# Patient Record
Sex: MALE | Race: WHITE | NOT HISPANIC OR LATINO | Employment: FULL TIME | URBAN - METROPOLITAN AREA
[De-identification: names, ages, dates, MRNs, and addresses within clinical notes are randomized per-mention and may not be internally consistent; named-entity substitution may affect disease eponyms.]

---

## 2018-05-23 LAB
ALBUMIN SERPL-MCNC: 4.6 G/DL
ALP SERPL-CCNC: 84 U/L
ALT SERPL-CCNC: 20 U/L
ANION GAP SERPL CALCULATED.3IONS-SCNC: 10 MMOL/L
AST SERPL-CCNC: 18 U/L
BILIRUB SERPL-MCNC: 2.2 MG/DL
BUN SERPL-MCNC: 13 MG/DL
CALCIUM SERPL-MCNC: 13 MG/DL
CHLORIDE SERPLBLD-SCNC: 107 MMOL/L
CHOLEST SERPL-MCNC: 240 MG/DL
CO2 SERPL-SCNC: 24 MMOL/L
CREAT SERPL-MCNC: 1.22 MG/DL
DIFFERENTIAL: NORMAL
ERYTHROCYTE [DISTWIDTH] IN BLOOD BY AUTOMATED COUNT: 13.1 %
GFR SERPL CREATININE-BSD FRML MDRD: >60 ML/MIN/1.73M2
GLUCOSE SERPL-MCNC: 102 MG/DL (ref 70–99)
HCT VFR BLD AUTO: 46 %
HDLC SERPL-MCNC: 47 MG/DL
HEMOGLOBIN: 15.5 G/DL (ref 13.3–17.7)
LDLC SERPL CALC-MCNC: 170 MG/DL
MCH RBC QN AUTO: 34 PG
MCHC RBC AUTO-ENTMCNC: 34 G/DL
MCV RBC AUTO: 99 FL
NONHDLC SERPL-MCNC: NORMAL MG/DL
PLATELET # BLD AUTO: 241 10^9/L
POTASSIUM SERPL-SCNC: 4.8 MMOL/L
PROT SERPL-MCNC: 6.8 G/DL
PSA SERPL-MCNC: 0.79 NG/ML
RBC # BLD AUTO: 4.62 10^12/L
SODIUM SERPL-SCNC: 141 MMOL/L
TRIGL SERPL-MCNC: 191 MG/DL
TSH SERPL-ACNC: 0.83 MCU/ML
WBC # BLD AUTO: 4.7 10^9/L

## 2020-11-03 NOTE — PROGRESS NOTES
3  SUBJECTIVE:   CC: Matt Haas is an 50 year old male who presents for preventive health visit.     Patient has been advised of split billing requirements and indicates understanding: Yes     Healthy Habits:    Do you get at least three servings of calcium containing foods daily (dairy, green leafy vegetables, etc.)? yes    Amount of exercise or daily activities, outside of work: Not consistently.     Problems taking medications regularly not applicable    Medication side effects: No    Have you had an eye exam in the past two years? yes    Do you see a dentist twice per year? yes    Do you have sleep apnea, excessive snoring or daytime drowsiness?no      PROBLEMS TO ADD ON...  Hypercholesterolemia  - per patient, he was previously taking a low dose of lipitor and he is interested in continuing this medication  - no previous medical records available at this point  - patient is agreeable to labs today to assess for efficacy and necessity of medication    Suspected hematospermia  - patient concerned he may have seen blood in his ejaculate once, back in September  - denies any re-occurrence  - no associated pain or fever    Today's PHQ-2 Score:   PHQ-2 ( 1999 Pf[employer]) 11/4/2020   Q1: Little interest or pleasure in doing things 0   Q2: Feeling down, depressed or hopeless 0   PHQ-2 Score 0       Abuse: Current or Past(Physical, Sexual or Emotional)- No  Do you feel safe in your environment? Yes        Social History     Tobacco Use     Smoking status: Never Smoker     Smokeless tobacco: Never Used   Substance Use Topics     Alcohol use: Yes     Frequency: 2-3 times a week     Drinks per session: 1 or 2     If you drink alcohol do you typically have >3 drinks per day or >7 drinks per week? No                      Last PSA: No results found for: PSA    Reviewed orders with patient. Reviewed health maintenance and updated orders accordingly - Yes    Patient is due for a colonoscopy, referred today    Family history  "of prostate cancer, decreased urination and question of hematospermia, patient agrees to PSA check today.     Reviewed and updated as needed this visit by clinical staff  Tobacco  Allergies  Meds  Problems  Med Hx  Surg Hx  Fam Hx          Reviewed and updated as needed this visit by Provider   Allergies  Meds  Problems  Med Hx  Surg Hx  Fam Hx         History reviewed. No pertinent past medical history.   History reviewed. No pertinent surgical history.    ROS:  CONSTITUTIONAL: NEGATIVE for fever, chills, change in weight  INTEGUMENTARY/SKIN: NEGATIVE for worrisome rashes, moles or lesions  EYES: NEGATIVE for vision changes or irritation  ENT: NEGATIVE for ear, mouth and throat problems  RESP: NEGATIVE for significant cough or SOB  CV: NEGATIVE for chest pain, palpitations or peripheral edema  GI: NEGATIVE for nausea, abdominal pain, heartburn, or change in bowel habits   male: negative for dysuria, hematuria, decreased urinary stream, erectile dysfunction, urethral discharge  MUSCULOSKELETAL: Mild intermittent RIGHT hip pain.  Otherwise, NEGATIVE for significant arthralgias or myalgia  NEURO: NEGATIVE for weakness, dizziness or paresthesias  PSYCHIATRIC: NEGATIVE for changes in mood or affect    OBJECTIVE:   /79 (BP Location: Right arm, Patient Position: Sitting, Cuff Size: Adult Large)   Pulse 68   Temp 97  F (36.1  C) (Skin)   Resp 16   Ht 1.816 m (5' 11.5\")   Wt 95.1 kg (209 lb 12 oz)   SpO2 96%   BMI 28.85 kg/m    EXAM:  GENERAL: healthy, alert and no distress  EYES: Eyes grossly normal to inspection, PERRL and conjunctivae and sclerae normal  HENT: ear canals and TM's normal, nose and mouth without ulcers or lesions  NECK: no adenopathy, no asymmetry, masses, or scars and thyroid normal to palpation  RESP: lungs clear to auscultation - no rales, rhonchi or wheezes  CV: regular rate and rhythm, normal S1 S2, no S3 or S4, no murmur, click or rub, no peripheral edema and peripheral " "pulses strong  ABDOMEN: soft, nontender, no hepatosplenomegaly, no masses and bowel sounds normal  MS: no gross musculoskeletal defects noted, no edema  SKIN: no suspicious lesions or rashes  NEURO: Normal strength and tone, mentation intact and speech normal  PSYCH: mentation appears normal, affect normal/bright    ASSESSMENT/PLAN:   Matt was seen today for physical.    Diagnoses and all orders for this visit:    Routine general medical examination at a health care facility    Need for vaccination  -     SD RIV4 (FLUBLOK) VACCINE RECOMBINANT DNA PRSRV ANTIBIO FREE, IM  -     SD ZOSTER VACCINE RECOMBINANT ADJUVANTED IM NJX    Screening for lipid disorders  -     Lipid Panel ([facility])  -     Basic Metabolic Panel ([facility])    Screening for prostate cancer  -     Prostate spec antigen screen    Screening for colon cancer  -     GASTROENTEROLOGY ADULT REF PROCEDURE ONLY; Future    I will look at patient's PSA today and I encouraged him to follow up with me if he notes recurrent hematospermia as I would strongly consider referral to urology.     I will follow up lipid panel and discuss continuing with statin once results are available.     Patient has been advised of split billing requirements and indicates understanding: Yes  COUNSELING:  Reviewed preventive health counseling, as reflected in patient instructions    Estimated body mass index is 28.85 kg/m  as calculated from the following:    Height as of this encounter: 1.816 m (5' 11.5\").    Weight as of this encounter: 95.1 kg (209 lb 12 oz).    Weight management plan: Discussed healthy diet and exercise guidelines    He reports that he has never smoked. He has never used smokeless tobacco.      Counseling Resources:  ATP IV Guidelines  Pooled Cohorts Equation Calculator  FRAX Risk Assessment  ICSI Preventive Guidelines  Dietary Guidelines for Americans, 2010  USDA's MyPlate  ASA Prophylaxis  Lung CA Screening    Wali Newberry MD  [facility]  "

## 2020-11-04 ENCOUNTER — OFFICE VISIT (OUTPATIENT)
Dept: FAMILY MEDICINE | Facility: CLINIC | Age: 50
End: 2020-11-04
Payer: COMMERCIAL

## 2020-11-04 VITALS
WEIGHT: 209.75 LBS | DIASTOLIC BLOOD PRESSURE: 79 MMHG | HEIGHT: 72 IN | OXYGEN SATURATION: 96 % | RESPIRATION RATE: 16 BRPM | SYSTOLIC BLOOD PRESSURE: 129 MMHG | HEART RATE: 68 BPM | BODY MASS INDEX: 28.41 KG/M2 | TEMPERATURE: 97 F

## 2020-11-04 DIAGNOSIS — Z12.11 SCREENING FOR COLON CANCER: ICD-10-CM

## 2020-11-04 DIAGNOSIS — Z12.5 SCREENING FOR PROSTATE CANCER: ICD-10-CM

## 2020-11-04 DIAGNOSIS — Z23 NEED FOR VACCINATION: ICD-10-CM

## 2020-11-04 DIAGNOSIS — Z13.220 SCREENING FOR LIPID DISORDERS: ICD-10-CM

## 2020-11-04 DIAGNOSIS — Z00.00 ROUTINE GENERAL MEDICAL EXAMINATION AT A HEALTH CARE FACILITY: Primary | ICD-10-CM

## 2020-11-04 LAB
BUN SERPL-MCNC: 14 MG/DL (ref 7–30)
CALCIUM SERPL-MCNC: 9 MG/DL (ref 8.5–10.4)
CHLORIDE SERPLBLD-SCNC: 104 MMOL/L (ref 94–109)
CHOLEST SERPL-MCNC: 253 MG/DL (ref 0–200)
CHOLEST/HDLC SERPL: 5.7 {RATIO} (ref 0–5)
CO2 SERPL-SCNC: 28 MMOL/L (ref 20–32)
CREAT SERPL-MCNC: 1.1 MG/DL (ref 0.8–1.5)
EGFR CALCULATED (BLACK REFERENCE): 90.4
EGFR CALCULATED (NON BLACK REFERENCE): 74.7
FASTING SPECIMEN: YES
GLUCOSE SERPL-MCNC: 107 MG/DL (ref 60–99)
HDLC SERPL-MCNC: 44 MG/DL
LDLC SERPL CALC-MCNC: 132 MG/DL (ref 0–129)
POTASSIUM SERPL-SCNC: 4.5 MMOL/L (ref 3.4–5.3)
PSA SERPL-ACNC: 0.97 UG/L (ref 0–4)
SODIUM SERPL-SCNC: 140 MMOL/L (ref 137.3–146.3)
TRIGL SERPL-MCNC: 381 MG/DL (ref 0–150)
VLDL-CHOLESTEROL: 76 (ref 7–32)

## 2020-11-04 SDOH — HEALTH STABILITY: MENTAL HEALTH: HOW MANY STANDARD DRINKS CONTAINING ALCOHOL DO YOU HAVE ON A TYPICAL DAY?: 1 OR 2

## 2020-11-04 SDOH — HEALTH STABILITY: MENTAL HEALTH: HOW OFTEN DO YOU HAVE A DRINK CONTAINING ALCOHOL?: 2-3 TIMES A WEEK

## 2020-11-04 SDOH — HEALTH STABILITY: MENTAL HEALTH: HOW OFTEN DO YOU HAVE 6 OR MORE DRINKS ON ONE OCCASION?: NOT ASKED

## 2020-11-04 ASSESSMENT — MIFFLIN-ST. JEOR: SCORE: 1831.48

## 2020-11-04 NOTE — LETTER
November 6, 2020      Matt Haas  [address]  [facility] [address]        Boone Gutierrez,     Here are the results from your recent labs. For the most part things look pretty good. Certainly we don't need to be talking about starting you on any medications at this point. Your cholesterol numbers are a little higher than I would like. At this point, my advice is mostly common sense stuff: try to stay active, eat mostly fruits and green leafy vegetables, with fish, lean meats or plant based proteins. Feel free to contact me if you have any questions about any of this.     Take care,     Wali Newberry MD    Resulted Orders   Lipid Panel ([facility])   Result Value Ref Range    FASTING SPECIMEN YES     Cholesterol 253.0 (H) 0.0 - 200.0    HDL Cholesterol 44.0 >40.0    Triglycerides 381.0 (H) 0.0 - 150.0    Cholesterol/HDL Ratio 5.7 (H) 0.0 - 5.0    LDL Cholesterol Direct 132.0 (H) 0.0 - 129.0    VLDL-Cholesterol 76.0 (H) 7.0 - 32.0   Basic Metabolic Panel ([facility])   Result Value Ref Range    Glucose 107.0 (H) 60.0 - 99.0 mg/dL    Urea Nitrogen 14.0 7.0 - 30.0 mg/dL    Calcium 9.0 8.5 - 10.4 mg/dL    Creatinine 1.1 0.8 - 1.5 mg/dL    eGFR Calculated (Non Black Reference) 74.7 >60.0    eGFR Calculated (Black Reference) 90.4 >60.0    Sodium 140.0 137.3 - 146.3 mmol/L    Potassium 4.5 3.4 - 5.3 mmol/L    Chloride 104.0 94.0 - 109.0 mmol/L    Carbon Dioxide 28.0 20.0 - 32.0 mmol/L   Prostate spec antigen screen   Result Value Ref Range    PSA 0.97 0 - 4 ug/L      Comment:      Assay Method:  Chemiluminescence using Siemens Vista analyzer

## 2020-11-04 NOTE — NURSING NOTE
"50 year old  Chief Complaint   Patient presents with     Physical     with shingles, flu, and labs       Blood pressure 129/79, pulse 68, temperature 97  F (36.1  C), temperature source Skin, resp. rate 16, height 1.816 m (5' 11.5\"), weight 95.1 kg (209 lb 12 oz), SpO2 96 %. Body mass index is 28.85 kg/m .  There is no problem list on file for this patient.      Wt Readings from Last 2 Encounters:   11/04/20 95.1 kg (209 lb 12 oz)     BP Readings from Last 3 Encounters:   11/04/20 129/79         No current outpatient medications on file.     No current facility-administered medications for this visit.        Social History     Tobacco Use     Smoking status: Never Smoker     Smokeless tobacco: Never Used   Substance Use Topics     Alcohol use: Yes     Frequency: 2-3 times a week     Drinks per session: 1 or 2     Drug use: Never       Health Maintenance Due   Topic Date Due     PREVENTIVE CARE VISIT  1968     COLORECTAL CANCER SCREENING  10/11/1978     HIV SCREENING  10/11/1983     HEPATITIS C SCREENING  10/11/1986     DTAP/TDAP/TD IMMUNIZATION (1 - Tdap) 10/11/1993     LIPID  10/11/2003     ZOSTER IMMUNIZATION (1 of 2) 10/11/2018     INFLUENZA VACCINE (1) 09/01/2020       No results found for: PAP      November 4, 2020 8:01 AM    "

## 2020-11-04 NOTE — NURSING NOTE
"Injectable Influenza Immunization Documentation    1.  Has the patient received the information for the injectable influenza vaccine? YES     2. Is the patient 6 months of age or older? YES     3. Does the patient have any of the following contraindications?         Severe allergy to eggs? No     Severe allergic reaction to previous influenza vaccines? No   Severe allergy to latex? No       History of Guillain-Frederic syndrome? No     Currently have a temperature greater than 100.4F? No        4.  Severely egg allergic patients should have flu vaccine eligibility assessed by an MD, RN, or pharmacist, and those who received flu vaccine should be observed for 15 min by an MD, RN, Pharmacist, Medical Technician, or member of clinic staff.\": YES    5. Latex-allergic patients should be given latex-free influenza vaccine No. Please reference the Vaccine latex table to determine if your clinic s product is latex-containing.       Vaccination given by Rocio Marcus CMA,[facility]  November 4, 2020 9:50 AM        Prior to immunization administration, verified patients identity using patient s name and date of birth. Please see Immunization Activity for additional information.     Screening Questionnaire for Adult Immunization    Are you sick today?   No   Do you have allergies to medications, food, a vaccine component or latex?   No   Have you ever had a serious reaction after receiving a vaccination?   No   Do you have a long-term health problem with heart, lung, kidney, or metabolic disease (e.g., diabetes), asthma, a blood disorder, no spleen, complement component deficiency, a cochlear implant, or a spinal fluid leak?  Are you on long-term aspirin therapy?   No   Do you have cancer, leukemia, HIV/AIDS, or any other immune system problem?   No   Do you have a parent, brother, or sister with an immune system problem?   No   In the past 3 months, have you taken medications that affect  your immune system, such as prednisone, other " steroids, or anticancer drugs; drugs for the treatment of rheumatoid arthritis, Crohn s disease, or psoriasis; or have you had radiation treatments?   No   Have you had a seizure, or a brain or other nervous system problem?   No   During the past year, have you received a transfusion of blood or blood    products, or been given immune (gamma) globulin or antiviral drug?   No   For women: Are you pregnant or is there a chance you could become       pregnant during the next month?   No   Have you received any vaccinations in the past 4 weeks?   No     Immunization questionnaire answers were all negative.        Per orders of Dr. Newberry, injection of Shigrix given by Rocio Marcus CMA. Patient instructed to remain in clinic for 15 minutes afterwards, and to report any adverse reaction to me immediately.       Screening performed by Rocio Marcus CMA on 11/4/2020 at 9:50 AM.

## 2020-11-05 ENCOUNTER — TELEPHONE (OUTPATIENT)
Dept: GASTROENTEROLOGY | Facility: CLINIC | Age: 50
End: 2020-11-05

## 2020-11-05 NOTE — TELEPHONE ENCOUNTER
Attempted to contact patient to schedule colonoscopy. Unable to LM as VM has not been set up yet.     Procedure: Lower Endoscopy    Lower Endoscopy Type: Colonoscopy    Purpose of Colonoscopy Procedure: Screening    Colonoscopy Sedation: Conscious/Moderate    Preferred Location: [facility]    Scheduling Instructions: If you have not heard from the scheduling office within 2 business days, please call [phone].      Dx: Screening for colon cancer [Z12.11]

## 2020-11-18 DIAGNOSIS — Z11.59 ENCOUNTER FOR SCREENING FOR OTHER VIRAL DISEASES: Primary | ICD-10-CM

## 2020-12-29 ENCOUNTER — TELEPHONE (OUTPATIENT)
Dept: GASTROENTEROLOGY | Facility: CLINIC | Age: 50
End: 2020-12-29

## 2021-01-04 ENCOUNTER — HOSPITAL ENCOUNTER (OUTPATIENT)
Facility: CLINIC | Age: 51
Setting detail: SPECIMEN
End: 2021-01-04
Payer: COMMERCIAL

## 2021-01-04 ENCOUNTER — ALLIED HEALTH/NURSE VISIT (OUTPATIENT)
Dept: FAMILY MEDICINE | Facility: CLINIC | Age: 51
End: 2021-01-04
Payer: COMMERCIAL

## 2021-01-04 DIAGNOSIS — Z11.59 ENCOUNTER FOR SCREENING FOR OTHER VIRAL DISEASES: ICD-10-CM

## 2021-01-04 DIAGNOSIS — Z23 NEED FOR VACCINATION: Primary | ICD-10-CM

## 2021-01-04 LAB
SARS-COV-2 RNA SPEC QL NAA+PROBE: NORMAL
SPECIMEN SOURCE: NORMAL

## 2021-01-04 PROCEDURE — 87635 SARS-COV-2 COVID-19 AMP PRB: CPT | Performed by: PATHOLOGY

## 2021-01-04 NOTE — PROGRESS NOTES
Prior to immunization administration, verified patients identity using patient s name and date of birth. Please see Immunization Activity for additional information.     Screening Questionnaire for Adult Immunization    Are you sick today?   No   Do you have allergies to medications, food, a vaccine component or latex?   No   Have you ever had a serious reaction after receiving a vaccination?   No   Do you have a long-term health problem with heart, lung, kidney, or metabolic disease (e.g., diabetes), asthma, a blood disorder, no spleen, complement component deficiency, a cochlear implant, or a spinal fluid leak?  Are you on long-term aspirin therapy?   No   Do you have cancer, leukemia, HIV/AIDS, or any other immune system problem?   No   Do you have a parent, brother, or sister with an immune system problem?   No   In the past 3 months, have you taken medications that affect  your immune system, such as prednisone, other steroids, or anticancer drugs; drugs for the treatment of rheumatoid arthritis, Crohn s disease, or psoriasis; or have you had radiation treatments?   No   Have you had a seizure, or a brain or other nervous system problem?   No   During the past year, have you received a transfusion of blood or blood    products, or been given immune (gamma) globulin or antiviral drug?   No   For women: Are you pregnant or is there a chance you could become       pregnant during the next month?   No   Have you received any vaccinations in the past 4 weeks?   No     Immunization questionnaire answers were all negative.        Per orders of Dr. Newberry, injection of 2nd Shingrix given by Rocio Marcus CMA. Patient instructed to remain in clinic for 15 minutes afterwards, and to report any adverse reaction to me immediately.       Screening performed by Rocio Marcus CMA on 1/4/2021 at 8:41 AM.    Matt Haas comes into clinic today at the request of Dr. Newberry Ordering Provider for Shingrix.    This service provided  today was under the supervising provider of the day Dr. Estrada, who was available if needed.    Rocio Marcus, CMA

## 2021-01-05 LAB
LABORATORY COMMENT REPORT: NORMAL
SARS-COV-2 RNA SPEC QL NAA+PROBE: NEGATIVE
SPECIMEN SOURCE: NORMAL

## 2021-01-07 ENCOUNTER — HOSPITAL ENCOUNTER (OUTPATIENT)
Facility: AMBULATORY SURGERY CENTER | Age: 51
Discharge: HOME | End: 2021-01-07
Attending: STUDENT IN AN ORGANIZED HEALTH CARE EDUCATION/TRAINING PROGRAM | Admitting: STUDENT IN AN ORGANIZED HEALTH CARE EDUCATION/TRAINING PROGRAM
Payer: COMMERCIAL

## 2021-01-07 VITALS
HEIGHT: 71 IN | OXYGEN SATURATION: 98 % | DIASTOLIC BLOOD PRESSURE: 89 MMHG | SYSTOLIC BLOOD PRESSURE: 125 MMHG | WEIGHT: 199 LBS | TEMPERATURE: 96.2 F | RESPIRATION RATE: 16 BRPM | BODY MASS INDEX: 27.86 KG/M2 | HEART RATE: 72 BPM

## 2021-01-07 LAB — COLONOSCOPY: NORMAL

## 2021-01-07 PROCEDURE — 45385 COLONOSCOPY W/LESION REMOVAL: CPT | Mod: 33

## 2021-01-07 PROCEDURE — 88305 TISSUE EXAM BY PATHOLOGIST: CPT | Mod: GC | Performed by: PATHOLOGY

## 2021-01-07 RX ORDER — NALOXONE HYDROCHLORIDE 0.4 MG/ML
0.2 INJECTION, SOLUTION INTRAMUSCULAR; INTRAVENOUS; SUBCUTANEOUS
Status: CANCELLED | OUTPATIENT
Start: 2021-01-07 | End: 2021-01-08

## 2021-01-07 RX ORDER — NALOXONE HYDROCHLORIDE 0.4 MG/ML
0.4 INJECTION, SOLUTION INTRAMUSCULAR; INTRAVENOUS; SUBCUTANEOUS
Status: CANCELLED | OUTPATIENT
Start: 2021-01-07 | End: 2021-01-08

## 2021-01-07 RX ORDER — ONDANSETRON 4 MG/1
4 TABLET, ORALLY DISINTEGRATING ORAL EVERY 6 HOURS PRN
Status: CANCELLED | OUTPATIENT
Start: 2021-01-07

## 2021-01-07 RX ORDER — FENTANYL CITRATE 50 UG/ML
INJECTION, SOLUTION INTRAMUSCULAR; INTRAVENOUS PRN
Status: DISCONTINUED | OUTPATIENT
Start: 2021-01-07 | End: 2021-01-07 | Stop reason: HOSPADM

## 2021-01-07 RX ORDER — LIDOCAINE 40 MG/G
CREAM TOPICAL
Status: DISCONTINUED | OUTPATIENT
Start: 2021-01-07 | End: 2021-01-08 | Stop reason: HOSPADM

## 2021-01-07 RX ORDER — PROCHLORPERAZINE MALEATE 10 MG
10 TABLET ORAL EVERY 6 HOURS PRN
Status: CANCELLED | OUTPATIENT
Start: 2021-01-07

## 2021-01-07 RX ORDER — FLUMAZENIL 0.1 MG/ML
0.2 INJECTION, SOLUTION INTRAVENOUS
Status: CANCELLED | OUTPATIENT
Start: 2021-01-07 | End: 2021-01-07

## 2021-01-07 RX ORDER — ONDANSETRON 2 MG/ML
4 INJECTION INTRAMUSCULAR; INTRAVENOUS EVERY 6 HOURS PRN
Status: CANCELLED | OUTPATIENT
Start: 2021-01-07

## 2021-01-07 RX ORDER — ONDANSETRON 2 MG/ML
4 INJECTION INTRAMUSCULAR; INTRAVENOUS
Status: DISCONTINUED | OUTPATIENT
Start: 2021-01-07 | End: 2021-01-08 | Stop reason: HOSPADM

## 2021-01-07 ASSESSMENT — MIFFLIN-ST. JEOR: SCORE: 1774.79

## 2021-01-08 LAB — COPATH REPORT: NORMAL

## 2021-02-03 ENCOUNTER — OFFICE VISIT (OUTPATIENT)
Dept: FAMILY MEDICINE | Facility: CLINIC | Age: 51
End: 2021-02-03
Payer: COMMERCIAL

## 2021-02-03 ENCOUNTER — TELEPHONE (OUTPATIENT)
Dept: FAMILY MEDICINE | Facility: CLINIC | Age: 51
End: 2021-02-03

## 2021-02-03 VITALS
HEIGHT: 72 IN | WEIGHT: 210.5 LBS | OXYGEN SATURATION: 98 % | RESPIRATION RATE: 15 BRPM | DIASTOLIC BLOOD PRESSURE: 101 MMHG | BODY MASS INDEX: 28.51 KG/M2 | SYSTOLIC BLOOD PRESSURE: 150 MMHG | HEART RATE: 73 BPM | TEMPERATURE: 96.7 F

## 2021-02-03 DIAGNOSIS — R03.0 ELEVATED BLOOD PRESSURE READING WITHOUT DIAGNOSIS OF HYPERTENSION: ICD-10-CM

## 2021-02-03 DIAGNOSIS — I49.8 OTHER CARDIAC ARRHYTHMIA: Primary | ICD-10-CM

## 2021-02-03 DIAGNOSIS — R00.2 PALPITATIONS: ICD-10-CM

## 2021-02-03 LAB
ANION GAP SERPL CALCULATED.3IONS-SCNC: 3 MMOL/L (ref 3–14)
BASOPHILS # BLD AUTO: 0 10E9/L (ref 0–0.2)
BASOPHILS NFR BLD AUTO: 0.7 %
BUN SERPL-MCNC: 11 MG/DL (ref 7–30)
CALCIUM SERPL-MCNC: 9.7 MG/DL (ref 8.5–10.1)
CHLORIDE SERPL-SCNC: 104 MMOL/L (ref 94–109)
CO2 SERPL-SCNC: 29 MMOL/L (ref 20–32)
CREAT SERPL-MCNC: 1.06 MG/DL (ref 0.66–1.25)
DIFFERENTIAL METHOD BLD: NORMAL
EOSINOPHIL # BLD AUTO: 0.1 10E9/L (ref 0–0.7)
EOSINOPHIL NFR BLD AUTO: 1.8 %
ERYTHROCYTE [DISTWIDTH] IN BLOOD BY AUTOMATED COUNT: 12 % (ref 10–15)
GFR SERPL CREATININE-BSD FRML MDRD: 80 ML/MIN/{1.73_M2}
GLUCOSE SERPL-MCNC: 88 MG/DL (ref 70–99)
HCT VFR BLD AUTO: 46.3 % (ref 40–53)
HGB BLD-MCNC: 16 G/DL (ref 13.3–17.7)
IMM GRANULOCYTES # BLD: 0 10E9/L (ref 0–0.4)
IMM GRANULOCYTES NFR BLD: 0.3 %
LYMPHOCYTES # BLD AUTO: 1.4 10E9/L (ref 0.8–5.3)
LYMPHOCYTES NFR BLD AUTO: 22.5 %
MCH RBC QN AUTO: 32.5 PG (ref 26.5–33)
MCHC RBC AUTO-ENTMCNC: 34.6 G/DL (ref 31.5–36.5)
MCV RBC AUTO: 94 FL (ref 78–100)
MONOCYTES # BLD AUTO: 0.5 10E9/L (ref 0–1.3)
MONOCYTES NFR BLD AUTO: 8.8 %
NEUTROPHILS # BLD AUTO: 4 10E9/L (ref 1.6–8.3)
NEUTROPHILS NFR BLD AUTO: 65.9 %
NRBC # BLD AUTO: 0 10*3/UL
NRBC BLD AUTO-RTO: 0 /100
PLATELET # BLD AUTO: 208 10E9/L (ref 150–450)
PLATELET # BLD EST: NORMAL 10*3/UL
POTASSIUM SERPL-SCNC: 5.1 MMOL/L (ref 3.4–5.3)
RBC # BLD AUTO: 4.92 10E12/L (ref 4.4–5.9)
SODIUM SERPL-SCNC: 136 MMOL/L (ref 133–144)
TSH SERPL DL<=0.005 MIU/L-ACNC: 1.26 MU/L (ref 0.4–4)
WBC # BLD AUTO: 6.1 10E9/L (ref 4–11)

## 2021-02-03 ASSESSMENT — MIFFLIN-ST. JEOR: SCORE: 1834.88

## 2021-02-03 NOTE — PROGRESS NOTES
Assessment & Plan     Other cardiac arrhythmia  - Cardiology Adult Referral - [place]; Future    Palpitations  - EKG 12-lead complete w/read - Clinics  - EKG rhythm strip  - TSH with free T4 reflex  - Basic metabolic panel  - CBC with platelets differential  - Cardiology Adult Referral - [place]; Future    Elevated blood pressure without a diagnosis of HTN  Continue to monitor    To ED if racing heart, chest pain, shortness of breath or lightheadedness.  Avoid exercise pending cardiology eval.  No alcohol, caffeine or decongestants.      35 minutes spent on the date of the encounter doing chart review, history and exam, documentation and further activities as noted above      Shelby Shin PA-C  [facility]    Kofi Gutierrez is a 50 year old who presents to clinic today for the following health issues     HPI     CHIEF COMPLAINT:  Irregular heart beat.  First noted on Monday when while working out his heart rate went up to 200.  He rested and it returned to normal.  Next day he woke up and felt fine but noted palpitations later in the day after work.  He went to sleep and woke up feeling fine and then developed the palpitations again today at 10AM.  Today they have been present most of the day.  Heart rate has not been fast but definitely irregular    He denies lightheadedness, dizziness, chest pain, shortness of breath and diaphoresis. BP is elevated today without a history of HTN.    No history of thyroid issues, recent blood loss or history of electrolyte problems.  He has 2 cups of coffee during the day and atleast one diet Mountain Dew.      BP Readings from Last 6 Encounters:   02/03/21 (!) 150/101   01/07/21 125/89   11/04/20 129/79       Review of Systems   Constitutional, HEENT, cardiovascular, pulmonary, gi and gu systems are negative, except as otherwise noted.      Objective    BP (!) 150/101   Pulse 73   Temp 96.7  F (35.9  C) (Skin)    "Resp 15   Ht 1.816 m (5' 11.5\")   Wt 95.5 kg (210 lb 8 oz)   SpO2 98%   BMI 28.95 kg/m    Body mass index is 28.95 kg/m .  Physical Exam   GENERAL: healthy, alert and no distress  NECK: no adenopathy, no asymmetry, masses, or scars and thyroid normal to palpation  RESP: lungs clear to auscultation - no rales, rhonchi or wheezes  CV: irregularly irregular rhythm, normal S1 S2, no S3 or S4, no murmur, click or rub, peripheral pulses strong, no peripheral edema and no bruits heard   MS: no gross musculoskeletal defects noted, no clubbing, edema or cyanosis of extremities.  PSYCH: mentation appears normal, affect normal/bright and anxious    EKG - Reviewed and interpreted by me Sinus rythym, normal axis, , no acute ST/T wave changes,multiple ectopic ventricular beats possibly junctional, there are no prior tracings available  Atrialventricular owen reentrant             "

## 2021-02-03 NOTE — TELEPHONE ENCOUNTER
"Patient is scheduled for visit for \"palpitations\". Call placed to patient for more information on his symptoms. LVM - Please call back to speak to a nurse.     Vane Muhammad RN  02/03/21  1:23 PM    "

## 2021-02-03 NOTE — NURSING NOTE
"50 year old  Chief Complaint   Patient presents with     Palpitations     heart rate up to 200s when working out yesterday, has been feeling irregular heart beats, EKG on watch showed irregular rhythm/a-fib       Blood pressure (!) 160/98, pulse (!) 47, temperature 96.7  F (35.9  C), temperature source Skin, resp. rate 15, height 1.816 m (5' 11.5\"), weight 95.5 kg (210 lb 8 oz), SpO2 98 %. Body mass index is 28.95 kg/m .  There is no problem list on file for this patient.      Wt Readings from Last 2 Encounters:   02/03/21 95.5 kg (210 lb 8 oz)   01/07/21 90.3 kg (199 lb)     BP Readings from Last 3 Encounters:   02/03/21 (!) 160/98   01/07/21 125/89   11/04/20 129/79         No current outpatient medications on file.     No current facility-administered medications for this visit.        Social History     Tobacco Use     Smoking status: Never Smoker     Smokeless tobacco: Never Used   Substance Use Topics     Alcohol use: Yes     Frequency: 2-3 times a week     Drinks per session: 1 or 2     Drug use: Never       Health Maintenance Due   Topic Date Due     HIV SCREENING  10/11/1983     HEPATITIS C SCREENING  10/11/1986       No results found for: PAP      February 3, 2021 4:10 PM    "

## 2021-02-04 ENCOUNTER — TELEPHONE (OUTPATIENT)
Dept: CARDIOLOGY | Facility: CLINIC | Age: 51
End: 2021-02-04

## 2021-02-04 ENCOUNTER — MYC MEDICAL ADVICE (OUTPATIENT)
Dept: FAMILY MEDICINE | Facility: CLINIC | Age: 51
End: 2021-02-04

## 2021-02-04 NOTE — TELEPHONE ENCOUNTER
RECORDS RECEIVED FROM: Internal    DATE RECEIVED: 2.5.21    NOTES STATUS DETAILS   OFFICE NOTE from referring provider    Shelby Romero   OFFICE NOTE from other cardiologist    na    DISCHARGE SUMMARY from hospital    na    DISCHARGE REPORT from the ER   na    OPERATIVE REPORT    na    MEDICATION LIST   Internal     LABS     BMP   Internal  2.3.21   CBC   Internal  2.3.21   CMP   Internal  5.23.18   Lipids   Internal  5.23.18   TSH   Internal  2.3.21   DIAGNOSTIC PROCEDURES     EKG   Internal  2.3.21   Monitor Reports   na    IMAGING (DISC & REPORT)      Echo   na    Stress Tests   na    Cath   na    MRI/MRA   na    CT/CTA   na

## 2021-02-04 NOTE — TELEPHONE ENCOUNTER
Hi  Please see Rina Shin's visit note form yesterday 2/3, and attached message - she would like pt seen within a week.  Please let us know what you come up with - thanks much -  Melanie Lam RN  [facility]

## 2021-02-04 NOTE — TELEPHONE ENCOUNTER
Called patient and left VM to call back if he can do an appointment today or tomorrow for a virtual appoint for palpitations

## 2021-02-04 NOTE — TELEPHONE ENCOUNTER
"Can we send a message to cardiology team to see if we can get this pateint in sooner than 3 weeks from now.  He has a significant arrhythmia and has had episodes of atrial fibrillation with a heart rate up to 200 per his \"watch\". No lightheadedness or dizziness. He would be willing to meet virtually.  EKG and rhythm strip are in Epic.   PLease let me know what you find out. I would consider an econsult but I really think this warrants a full visit and 3 weeks is too far out.  Thanks  Rina Shin PA-C     "

## 2021-02-04 NOTE — TELEPHONE ENCOUNTER
Pt called by cardiology for appt Friday 2/5 with Dr Lali Montana .  Melanie Lam RN  [facility]

## 2021-02-05 ENCOUNTER — VIRTUAL VISIT (OUTPATIENT)
Dept: CARDIOLOGY | Facility: CLINIC | Age: 51
End: 2021-02-05
Attending: PHYSICIAN ASSISTANT
Payer: COMMERCIAL

## 2021-02-05 ENCOUNTER — PRE VISIT (OUTPATIENT)
Dept: CARDIOLOGY | Facility: CLINIC | Age: 51
End: 2021-02-05

## 2021-02-05 DIAGNOSIS — R00.2 PALPITATIONS: ICD-10-CM

## 2021-02-05 DIAGNOSIS — E78.5 HYPERLIPIDEMIA LDL GOAL <100: ICD-10-CM

## 2021-02-05 DIAGNOSIS — I49.3 PVC'S (PREMATURE VENTRICULAR CONTRACTIONS): ICD-10-CM

## 2021-02-05 DIAGNOSIS — I49.1 PREMATURE ATRIAL BEAT: Primary | ICD-10-CM

## 2021-02-05 PROCEDURE — 99205 OFFICE O/P NEW HI 60 MIN: CPT | Mod: 95 | Performed by: INTERNAL MEDICINE

## 2021-02-05 RX ORDER — ROSUVASTATIN CALCIUM 10 MG/1
10 TABLET, COATED ORAL DAILY
Qty: 90 TABLET | Refills: 3 | Status: SHIPPED | OUTPATIENT
Start: 2021-02-05 | End: 2022-01-19

## 2021-02-05 RX ORDER — METOPROLOL SUCCINATE 25 MG/1
25 TABLET, EXTENDED RELEASE ORAL DAILY
Qty: 90 TABLET | Refills: 3 | Status: SHIPPED | OUTPATIENT
Start: 2021-02-05 | End: 2021-06-11

## 2021-02-05 NOTE — PROGRESS NOTES
"Pablo is a 50 year old who is being evaluated via a billable video visit.       How would you like to obtain your AVS? MyChart  If the video visit is dropped, the invitation should be resent by: Text to cell phone: [phone]  Will anyone else be joining your video visit? No       Video Start Time: 7:05 AM      Vitals - Patient Reported  Weight (Patient Reported): 92.8 kg (204 lb 9.6 oz)  Height (Patient Reported): 181.6 cm (5' 11.5\")  BMI (Based on Pt Reported Ht/Wt): 28.14  Pain Score: No Pain (0)(No SOB)        Video-Visit Details     Type of service:  Video Visit     Video End Time:7:53 AM    Originating Location (pt. Location): Home     Distant Location (provider location):  [facility] HEART [facility]      Platform used for Video Visit: [facility]    CARDIOLOGY CLINIC INITIAL CONSULTATION    HPI:  Matt Haas is a 50 year old male who receives primary care from Dr. Wali Newberry. He was referred to Cardiology clinic by Rina Shin for palpitations.    Pablo is a pleasant man with no history of atherosclerotic CVD. CVD risk factors include hyperlipidemia.  He is a former smoker and quit in 2001.  His main complaint today is about a 1 week history of very bothersome palpitations.  He has noticed irregular, erratic beats.  He occasionally feels lightheaded, fullness in his neck, chest discomfort.  During this timeframe he has also noticed that his blood pressure has gone up which is uncommon for him.  He is generally healthy and is not on any medications currently.  He denies PND, orthopnea, LE edema, dyspnea on exertion.    Pablo wears an apple watch which showed a heart rate up to 200 when he was working out earlier this week.  The apple watch also alerted him that he may be having atrial fibrillation.  He had 2 EKGs done that I can review which showed frequent PACs and PVCs.  His laboratory work-up has been unremarkable including a normal TSH, normal electrolytes, normal WBC and hemoglobin.  He denies any " infectious symptoms.  No symptoms of hypoglycemia.  No major change in diet or caffeine intake.  No stimulant drug or supplemental use.  Michael usually has 2 cups of coffee in the morning and a soda in the afternoon, but this has been constant.    We also talked about cardiovascular disease risk today.  Pablo is a young man with a pretty healthy lifestyle.  Despite that his LDL cholesterol is elevated and has been as high as 170.  High cholesterol is common on his mother side, but no one on that side has had any early or even later life heart disease.  Pablo spends about 30 minutes every day working out on the treadmill or Peloton.  He has been on atorvastatin and rosuvastatin in the past, but has recently stopped it after discussing with his primary care provider.  It sounds like he has had coronary artery calcium scans before which showed some calcifications, but I am unable to review this data.    The 10-year ASCVD risk score (Rufus SHARPE Jr., et al., 2013) is: 8.5%*    Values used to calculate the score:      Age: 50 years      Sex: Male      Is Non- : No      Diabetic: No      Tobacco smoker: No      Systolic Blood Pressure: 150 mmHg      Is BP treated: No      HDL Cholesterol: 44 mg/dL*      Total Cholesterol: 253 mg/dL*      * - Cholesterol units were assumed for this score calculation     ASSESSMENT AND PLAN  Matt Haas is a 50 year old male who presents for palpitations that seem to be related to frequent PACs and PVCs.  He does not have any symptoms of angina or heart failure.  His apple watch alerted him to atrial fibrillation, but is unclear if he actually had this rhythm.  He is understandably concerned about the abnormal rhythm and the symptoms that he is having.  We discussed the relatively benign nature of PACs and PVCs, although they can certainly be bothersome.  I would like him to wear a Zio patch to make sure that were not missing another type of arrhythmia, specifically  atrial fibrillation.  We will also get an echo to ensure there are no structural abnormalities.  I offered to start a low-dose metoprolol to help with symptoms.  I sent the prescription and Pablo will decide if he is interested in starting now or waiting until we have more data.  Increased stress can certainly be contributing to symptoms so we also discussed stress management techniques, like mindfulness.    In terms of CVD prevention, it sounds like Pablo does have evidence of early CAD based on a CAC scan.  He does have elevated cholesterol and there is likely genetic component to this given his healthy lifestyle.  Fortunately there has been no early CVD in his family.  I would still recommend starting at least a moderate dose of lipid-lowering therapy.  We discussed repeating a CAC scan, which he agrees with.  We will also start rosuvastatin 10 mg daily and repeat a lipid panel in 2 months.  He is scheduled to do the executive health program at [place] in the next couple months where he will receive more information and data regarding his cardiovascular health.    Recommendations:  -2-week Zio patch  -Echo  -I will write a prescription for metoprolol 25 mg daily.  If palpitations remain bothersome, Pablo can start this at any time.  -Start rosuvastatin 10 mg daily  -Repeat lipid panel in 2 months  -I will contact Pablo after the test results are back and determine a follow-up plan at that time    Thank you for the opportunity to participate in the care of Mr. Pablo Haas. Please feel free to contact me with any additional questions or concerns.    Ag Montana MD    Preventive Cardiology  Pager: [phone]    More than 50% of the face-to-face time was spent counseling the patient.  Total face-to-face time on video was 48 minutes; and an additional 15 minutes was spent reviewing the records for imaging, labs, pathology reports, and clinic notes from internal and external records and documenting  our visit.      PAST MEDICAL HISTORY:  Patient Active Problem List   Diagnosis     Palpitations     No past medical history on file.    CURRENT MEDICATIONS:  Current Outpatient Medications   Medication Sig Dispense Refill     metoprolol succinate ER (TOPROL-XL) 25 MG 24 hr tablet Take 1 tablet (25 mg) by mouth daily 90 tablet 3     rosuvastatin (CRESTOR) 10 MG tablet Take 1 tablet (10 mg) by mouth daily 90 tablet 3       PAST SURGICAL HISTORY:  Past Surgical History:   Procedure Laterality Date     COLONOSCOPY N/A 2021    Procedure: COLONOSCOPY, WITH POLYPECTOMY;  Surgeon: Elina Whyte MD;  Location: [facility] OR       ALLERGIES  Patient has no known allergies.    FAMILY HX:  Family History   Problem Relation Age of Onset     Prostate Cancer Father      Parkinsonism Father      Colon Cancer Maternal Grandfather      Myocardial Infarction Paternal Grandfather 70     Pacemaker Paternal Grandfather      Hyperlipidemia Mother      Hyperlipidemia Maternal Grandmother        SOCIAL HX:  Social History     Tobacco Use     Smoking status: Former Smoker     Years: 16.00     Quit date:      Years since quittin.1     Smokeless tobacco: Never Used   Substance Use Topics     Alcohol use: Yes     Frequency: 2-3 times a week     Drinks per session: 1 or 2     Drug use: Never        ROS:  Comprehensive ROS is negative except as noted in HPI.    VITAL SIGNS:  There were no vitals taken for this visit.  There is no height or weight on file to calculate BMI.  Wt Readings from Last 2 Encounters:   21 95.5 kg (210 lb 8 oz)   21 90.3 kg (199 lb)       PHYSICAL EXAM  Gen: pleasant man sitting comfortably in NAD  Head: nc/at  Eyes: EOMI  Resp: Breathing comfortably on room air  Skin: no rash on limited exam  Neuro: awake, alert, oriented, nml speech  Pysch: Normal affect    The rest of a comprehensive physical examination is deferred due to PHE (public health emergency) video visit restrictions.     LABS: personally  reviewed  CMP  Recent Labs   Lab Test 02/03/21  1636 11/04/20 0828 05/23/18    140.0 141   POTASSIUM 5.1 4.5 4.8   CHLORIDE 104 104.0 107   CO2 29 28.0 24   ANIONGAP 3  --  10   GLC 88 107.0* 102*   BUN 11 14.0 13   CR 1.06 1.1 1.22   GFRESTIMATED 80  --  >60   GFRESTBLACK >90  --  >60   DONNA 9.7 9.0 13   PROTTOTAL  --   --  6.8   ALBUMIN  --   --  4.6   BILITOTAL  --   --  2.2   ALKPHOS  --   --  84   AST  --   --  18   ALT  --   --  20     CBC  Recent Labs   Lab Test 02/03/21  1636 05/23/18   WBC 6.1 4.7   RBC 4.92 4.62   HGB 16.0 15.5   HCT 46.3 46   MCV 94 99   MCH 32.5 34   MCHC 34.6 34   RDW 12.0 13.1    241     INRNo lab results found.  Recent Labs   Lab Test 11/04/20 0828 05/23/18   CHOL 253.0* 240   HDL 44.0 47   .0* 170   TRIG 381.0* 191   CHOLHDLRATIO 5.7*  --      No lab results found.    Most recent EKG: reviewed and discussed with the patient  2/3/2021: Sinus rhythm with frequent PACs and PVCs

## 2021-02-05 NOTE — PROGRESS NOTES
"Pablo is a 50 year old who is being evaluated via a billable video visit.      How would you like to obtain your AVS? MyChart  If the video visit is dropped, the invitation should be resent by: Text to cell phone: [phone]  Will anyone else be joining your video visit? No  {If patient encounters technical issues they should call [phone] :[id number]}    Video Start Time: {video visit start/end time for provider to select:152948}     Vitals - Patient Reported  Weight (Patient Reported): 92.8 kg (204 lb 9.6 oz)  Height (Patient Reported): 181.6 cm (5' 11.5\")  BMI (Based on Pt Reported Ht/Wt): 28.14  Pain Score: No Pain (0)(No SOB)        Video-Visit Details    Type of service:  Video Visit    Video End Time:{video visit start/end time for provider to select:152948}    Originating Location (pt. Location): {video visit patient location:358059::\"Home\"}    Distant Location (provider location):  [facility]     Platform used for Video Visit: {Virtual Visit Platforms:249172::\"SI-BONE\"}  "

## 2021-02-05 NOTE — PATIENT INSTRUCTIONS
"You were seen today in the Cardiovascular Clinic at the [facility].     Cardiology Providers you saw during your visit: Dr. Ag Montana    Diagnosis:   Encounter Diagnoses   Name Primary?     Palpitations      Premature atrial beat Yes     Hyperlipidemia LDL goal <100           Orders:   Orders Placed This Encounter   Procedures     CT Coronary Calcium Scan     Leadless EKG Monitor 8 to 14 Days     Echocardiogram Complete       Recommendations:   1. Schedule a visit to the [facility] on [address] to get your heart monitor hooked up.  2. Schedule a heart ultrasound (echo) at [facility].  3. Schedule a coronary artery calcium scan whenever it is convenient.  4. Start rosuvastatin 10mg daily.  Repeat lipid panel in 2 months  5. If the palpitations become very bothersome, start metoprolol 25mg once a day.  6. If you are interested in more in depth evaluation for early signs of heart disease, we can refer you to the [place] [facility].   7. Follow up based on the test results.      Please feel free to call me with any questions or concerns.       Emily PARKER LPN     Questions: [phone]  First press #1 for the [employer] for \"Medical Questions\" to reach us Cardiology Nurses.   [phone]   First press #1 for the [employer] and then press #1     On Call Cardiologist for after hours or on weekends: [phone]   option #4 and ask to speak to the on-call Cardiologist.          If you need a medication refill please contact your pharmacy.  Please allow 3 business days for your refill to be completed.  --------------------------------------------------------------   "

## 2021-02-05 NOTE — LETTER
"2/5/2021      RE: Matt Haas  [address]  [place] [address]       Dear Colleague,    Thank you for the opportunity to participate in the care of your patient, Matt Haas, at the [facility] HEART [facility]. Please see a copy of my visit note below.    Pablo is a 50 year old who is being evaluated via a billable video visit.       How would you like to obtain your AVS? MyChart  If the video visit is dropped, the invitation should be resent by: Text to cell phone: [phone]  Will anyone else be joining your video visit? No       Video Start Time: 7:05 AM      Vitals - Patient Reported  Weight (Patient Reported): 92.8 kg (204 lb 9.6 oz)  Height (Patient Reported): 181.6 cm (5' 11.5\")  BMI (Based on Pt Reported Ht/Wt): 28.14  Pain Score: No Pain (0)(No SOB)        Video-Visit Details     Type of service:  Video Visit     Video End Time:7:53 AM    Originating Location (pt. Location): Home     Distant Location (provider location):  [facility] HE[facility]      Platform used for Video Visit: [facility]    CARDIOLOGY CLINIC INITIAL CONSULTATION    HPI:  Matt Haas is a 50 year old male who receives primary care from Dr. Wali Newberry. He was referred to Cardiology clinic by Rina Shin for palpitations.    Pablo is a pleasant man with no history of atherosclerotic CVD. CVD risk factors include hyperlipidemia.  He is a former smoker and quit in 2001.  His main complaint today is about a 1 week history of very bothersome palpitations.  He has noticed irregular, erratic beats.  He occasionally feels lightheaded, fullness in his neck, chest discomfort.  During this timeframe he has also noticed that his blood pressure has gone up which is uncommon for him.  He is generally healthy and is not on any medications currently.  He denies PND, orthopnea, LE edema, dyspnea on exertion.    Pablo wears an apple watch which showed a " heart rate up to 200 when he was working out earlier this week.  The apple watch also alerted him that he may be having atrial fibrillation.  He had 2 EKGs done that I can review which showed frequent PACs and PVCs.  His laboratory work-up has been unremarkable including a normal TSH, normal electrolytes, normal WBC and hemoglobin.  He denies any infectious symptoms.  No symptoms of hypoglycemia.  No major change in diet or caffeine intake.  No stimulant drug or supplemental use.  Michael usually has 2 cups of coffee in the morning and a soda in the afternoon, but this has been constant.    We also talked about cardiovascular disease risk today.  Pablo is a young man with a pretty healthy lifestyle.  Despite that his LDL cholesterol is elevated and has been as high as 170.  High cholesterol is common on his mother side, but no one on that side has had any early or even later life heart disease.  Pablo spends about 30 minutes every day working out on the treadmill or Peloton.  He has been on atorvastatin and rosuvastatin in the past, but has recently stopped it after discussing with his primary care provider.  It sounds like he has had coronary artery calcium scans before which showed some calcifications, but I am unable to review this data.    The 10-year ASCVD risk score (Rufus ANNEMARIE Jr., et al., 2013) is: 8.5%*    Values used to calculate the score:      Age: 50 years      Sex: Male      Is Non- : No      Diabetic: No      Tobacco smoker: No      Systolic Blood Pressure: 150 mmHg      Is BP treated: No      HDL Cholesterol: 44 mg/dL*      Total Cholesterol: 253 mg/dL*      * - Cholesterol units were assumed for this score calculation     ASSESSMENT AND PLAN  Matt Haas is a 50 year old male who presents for palpitations that seem to be related to frequent PACs and PVCs.  He does not have any symptoms of angina or heart failure.  His apple watch alerted him to atrial fibrillation, but is unclear  if he actually had this rhythm.  He is understandably concerned about the abnormal rhythm and the symptoms that he is having.  We discussed the relatively benign nature of PACs and PVCs, although they can certainly be bothersome.  I would like him to wear a Zio patch to make sure that were not missing another type of arrhythmia, specifically atrial fibrillation.  We will also get an echo to ensure there are no structural abnormalities.  I offered to start a low-dose metoprolol to help with symptoms.  I sent the prescription and Pablo will decide if he is interested in starting now or waiting until we have more data.  Increased stress can certainly be contributing to symptoms so we also discussed stress management techniques, like mindfulness.    In terms of CVD prevention, it sounds like Pablo does have evidence of early CAD based on a CAC scan.  He does have elevated cholesterol and there is likely genetic component to this given his healthy lifestyle.  Fortunately there has been no early CVD in his family.  I would still recommend starting at least a moderate dose of lipid-lowering therapy.  We discussed repeating a CAC scan, which he agrees with.  We will also start rosuvastatin 10 mg daily and repeat a lipid panel in 2 months.  He is scheduled to do the executive health program at [place] in the next couple months where he will receive more information and data regarding his cardiovascular health.    Recommendations:  -2-week Zio patch  -Echo  -I will write a prescription for metoprolol 25 mg daily.  If palpitations remain bothersome, Pablo can start this at any time.  -Start rosuvastatin 10 mg daily  -Repeat lipid panel in 2 months  -I will contact Pablo after the test results are back and determine a follow-up plan at that time    Thank you for the opportunity to participate in the care of Mr. Pablo Haas. Please feel free to contact me with any additional questions or concerns.    Ag Montana MD  Assistant  Professor  Preventive Cardiology  Pager: [phone]    More than 50% of the face-to-face time was spent counseling the patient.  Total face-to-face time on video was 48 minutes; and an additional 15 minutes was spent reviewing the records for imaging, labs, pathology reports, and clinic notes from internal and external records and documenting our visit.      PAST MEDICAL HISTORY:  Patient Active Problem List   Diagnosis     Palpitations     No past medical history on file.    CURRENT MEDICATIONS:  Current Outpatient Medications   Medication Sig Dispense Refill     metoprolol succinate ER (TOPROL-XL) 25 MG 24 hr tablet Take 1 tablet (25 mg) by mouth daily 90 tablet 3     rosuvastatin (CRESTOR) 10 MG tablet Take 1 tablet (10 mg) by mouth daily 90 tablet 3       PAST SURGICAL HISTORY:  Past Surgical History:   Procedure Laterality Date     COLONOSCOPY N/A 2021    Procedure: COLONOSCOPY, WITH POLYPECTOMY;  Surgeon: Elina Whyte MD;  Location: [facility] OR       ALLERGIES  Patient has no known allergies.    FAMILY HX:  Family History   Problem Relation Age of Onset     Prostate Cancer Father      Parkinsonism Father      Colon Cancer Maternal Grandfather      Myocardial Infarction Paternal Grandfather 70     Pacemaker Paternal Grandfather      Hyperlipidemia Mother      Hyperlipidemia Maternal Grandmother        SOCIAL HX:  Social History     Tobacco Use     Smoking status: Former Smoker     Years: 16.00     Quit date:      Years since quittin.1     Smokeless tobacco: Never Used   Substance Use Topics     Alcohol use: Yes     Frequency: 2-3 times a week     Drinks per session: 1 or 2     Drug use: Never        ROS:  Comprehensive ROS is negative except as noted in HPI.    VITAL SIGNS:  There were no vitals taken for this visit.  There is no height or weight on file to calculate BMI.  Wt Readings from Last 2 Encounters:   21 95.5 kg (210 lb 8 oz)   21 90.3 kg (199 lb)       PHYSICAL EXAM  Gen:  pleasant man sitting comfortably in NAD  Head: nc/at  Eyes: EOMI  Resp: Breathing comfortably on room air  Skin: no rash on limited exam  Neuro: awake, alert, oriented, nml speech  Pysch: Normal affect    The rest of a comprehensive physical examination is deferred due to PHE (public health emergency) video visit restrictions.     LABS: personally reviewed  CMP  Recent Labs   Lab Test 02/03/21  1636 11/04/20  0828 05/23/18    140.0 141   POTASSIUM 5.1 4.5 4.8   CHLORIDE 104 104.0 107   CO2 29 28.0 24   ANIONGAP 3  --  10   GLC 88 107.0* 102*   BUN 11 14.0 13   CR 1.06 1.1 1.22   GFRESTIMATED 80  --  >60   GFRESTBLACK >90  --  >60   DONNA 9.7 9.0 13   PROTTOTAL  --   --  6.8   ALBUMIN  --   --  4.6   BILITOTAL  --   --  2.2   ALKPHOS  --   --  84   AST  --   --  18   ALT  --   --  20     CBC  Recent Labs   Lab Test 02/03/21  1636 05/23/18   WBC 6.1 4.7   RBC 4.92 4.62   HGB 16.0 15.5   HCT 46.3 46   MCV 94 99   MCH 32.5 34   MCHC 34.6 34   RDW 12.0 13.1    241     INRNo lab results found.  Recent Labs   Lab Test 11/04/20 0828 05/23/18   CHOL 253.0* 240   HDL 44.0 47   .0* 170   TRIG 381.0* 191   CHOLHDLRATIO 5.7*  --      No lab results found.    Most recent EKG: reviewed and discussed with the patient  2/3/2021: Sinus rhythm with frequent PACs and PVCs        Please do not hesitate to contact me if you have any questions/concerns.     Sincerely,     Ag Montana MD

## 2021-02-08 ENCOUNTER — ALLIED HEALTH/NURSE VISIT (OUTPATIENT)
Dept: CARDIOLOGY | Facility: CLINIC | Age: 51
End: 2021-02-08
Attending: INTERNAL MEDICINE
Payer: COMMERCIAL

## 2021-02-08 DIAGNOSIS — R00.2 PALPITATIONS: ICD-10-CM

## 2021-02-08 DIAGNOSIS — I49.1 PREMATURE ATRIAL BEAT: ICD-10-CM

## 2021-02-10 ENCOUNTER — TELEPHONE (OUTPATIENT)
Dept: CARDIOLOGY | Facility: CLINIC | Age: 51
End: 2021-02-10

## 2021-02-10 PROCEDURE — 93244 EXT ECG>48HR<7D REV&INTERPJ: CPT | Performed by: INTERNAL MEDICINE

## 2021-02-10 NOTE — TELEPHONE ENCOUNTER
Called and lvm to schedule Echo/ Calcium scan after the zio patch is taken off. ( gave imaging number) sent mychart and letter.     Also let patient know emma chua wants fasting labs in April ( can do any local [facility]) ( gave cardiology number)

## 2021-02-25 ENCOUNTER — HOSPITAL ENCOUNTER (OUTPATIENT)
Dept: CARDIOLOGY | Facility: CLINIC | Age: 51
End: 2021-02-25
Attending: INTERNAL MEDICINE
Payer: COMMERCIAL

## 2021-02-25 ENCOUNTER — HOSPITAL ENCOUNTER (OUTPATIENT)
Dept: CT IMAGING | Facility: CLINIC | Age: 51
End: 2021-02-25
Attending: INTERNAL MEDICINE
Payer: COMMERCIAL

## 2021-02-25 DIAGNOSIS — R00.2 PALPITATIONS: ICD-10-CM

## 2021-02-25 DIAGNOSIS — E78.5 HYPERLIPIDEMIA LDL GOAL <100: ICD-10-CM

## 2021-02-25 DIAGNOSIS — I49.1 PREMATURE ATRIAL BEAT: ICD-10-CM

## 2021-02-25 LAB — RADIOLOGIST FLAGS: NORMAL

## 2021-02-25 PROCEDURE — 93306 TTE W/DOPPLER COMPLETE: CPT

## 2021-02-25 PROCEDURE — 93306 TTE W/DOPPLER COMPLETE: CPT | Mod: 26 | Performed by: INTERNAL MEDICINE

## 2021-02-25 PROCEDURE — 75571 CT HRT W/O DYE W/CA TEST: CPT

## 2021-02-25 PROCEDURE — 75571 CT HRT W/O DYE W/CA TEST: CPT | Mod: 26 | Performed by: INTERNAL MEDICINE

## 2021-06-11 ENCOUNTER — OFFICE VISIT (OUTPATIENT)
Dept: FAMILY MEDICINE | Facility: CLINIC | Age: 51
End: 2021-06-11
Payer: COMMERCIAL

## 2021-06-11 VITALS
HEIGHT: 71 IN | OXYGEN SATURATION: 99 % | SYSTOLIC BLOOD PRESSURE: 125 MMHG | BODY MASS INDEX: 28.11 KG/M2 | HEART RATE: 99 BPM | DIASTOLIC BLOOD PRESSURE: 85 MMHG | WEIGHT: 200.8 LBS | TEMPERATURE: 97.1 F | RESPIRATION RATE: 14 BRPM

## 2021-06-11 DIAGNOSIS — T83.511A URINARY TRACT INFECTION ASSOCIATED WITH CATHETERIZATION OF URINARY TRACT, UNSPECIFIED INDWELLING URINARY CATHETER TYPE, INITIAL ENCOUNTER (H): ICD-10-CM

## 2021-06-11 DIAGNOSIS — N39.0 URINARY TRACT INFECTION ASSOCIATED WITH CATHETERIZATION OF URINARY TRACT, UNSPECIFIED INDWELLING URINARY CATHETER TYPE, INITIAL ENCOUNTER (H): ICD-10-CM

## 2021-06-11 DIAGNOSIS — N32.89 BLADDER IRRITABILITY: Primary | ICD-10-CM

## 2021-06-11 PROBLEM — R93.89 ABNORMAL COMPUTED TOMOGRAPHY SCAN: Status: ACTIVE | Noted: 2021-04-29

## 2021-06-11 PROBLEM — R31.9 HEMATURIA: Status: ACTIVE | Noted: 2021-05-07

## 2021-06-11 PROBLEM — R74.8 ELEVATED LIVER ENZYMES: Status: ACTIVE | Noted: 2019-02-25

## 2021-06-11 PROBLEM — M25.519 SHOULDER PAIN: Status: ACTIVE | Noted: 2021-06-11

## 2021-06-11 PROBLEM — R91.8 MULTIPLE PULMONARY NODULES: Status: ACTIVE | Noted: 2021-04-29

## 2021-06-11 PROBLEM — M16.9 OSTEOARTHRITIS OF HIP: Status: ACTIVE | Noted: 2021-06-11

## 2021-06-11 PROBLEM — N32.9 LESION OF URINARY BLADDER: Status: ACTIVE | Noted: 2021-05-07

## 2021-06-11 LAB
BILIRUBIN UR: NEGATIVE MG/DL
BLOOD UR: ABNORMAL MG/DL
GLUCOSE URINE: NEGATIVE
KETONES UR QL: NEGATIVE MG/DL
LEUKOCYTE ESTERASE UR: ABNORMAL
NITRITE UR QL STRIP: POSITIVE MG/DL
PH UR STRIP: 6.5 [PH] (ref 4.5–8)
PROTEIN UR: NEGATIVE MG/DL
SP GR UR STRIP: 1.01 (ref 1–1.03)
UROBILINOGEN UR STRIP-ACNC: ABNORMAL E.U./DL

## 2021-06-11 RX ORDER — TAMSULOSIN HYDROCHLORIDE 0.4 MG/1
0.4 CAPSULE ORAL
COMMUNITY
Start: 2021-05-27 | End: 2022-02-22

## 2021-06-11 RX ORDER — CIPROFLOXACIN 750 MG/1
750 TABLET, FILM COATED ORAL 2 TIMES DAILY
Qty: 14 TABLET | Refills: 0 | Status: SHIPPED | OUTPATIENT
Start: 2021-06-11 | End: 2021-06-17

## 2021-06-11 RX ORDER — B-COMPLEX WITH VITAMIN C
1 TABLET ORAL
COMMUNITY

## 2021-06-11 RX ORDER — OXYBUTYNIN CHLORIDE 10 MG/1
10 TABLET, EXTENDED RELEASE ORAL
COMMUNITY
Start: 2021-05-27 | End: 2022-02-22

## 2021-06-11 RX ORDER — UBIDECARENONE 100 MG
100 CAPSULE ORAL
COMMUNITY

## 2021-06-11 RX ORDER — METOPROLOL SUCCINATE 25 MG/1
TABLET, EXTENDED RELEASE ORAL
COMMUNITY
Start: 2021-02-05 | End: 2022-02-22

## 2021-06-11 ASSESSMENT — MIFFLIN-ST. JEOR: SCORE: 1790.82

## 2021-06-11 NOTE — PROGRESS NOTES
SUBJECTIVE:   Matt Haas is a 50 year old male who presents to clinic today to discuss the following problem(s).    (N32.89) Bladder irritability  (primary encounter diagnosis)  - has recently been addressing bladder cancer, treatment through the [facility]  - had a bladder tumor removed and had a catheter in place after procedure  - catheter was removed on Monday of this week (4 days ago)  - initially felt great, but over the past few days has felt significant burning with urination  - increased urinary frequency, chills  - no gross hematuria  - no nausea, no flank pain       ROS: 10 point ROS neg other than the symptoms noted above in the HPI.      Past Medical History:   Diagnosis Date     Bladder cancer (H)      Past Surgical History:   Procedure Laterality Date     COLONOSCOPY N/A 2021    Procedure: COLONOSCOPY, WITH POLYPECTOMY;  Surgeon: Elina Whyte MD;  [place] [facility] [place]     Family History   Problem Relation Age of Onset     Prostate Cancer Father      Parkinsonism Father      Colon Cancer Maternal Grandfather      Myocardial Infarction Paternal Grandfather 70     Pacemaker Paternal Grandfather      Hyperlipidemia Mother      Hyperlipidemia Maternal Grandmother      Social History     Tobacco Use     Smoking status: Former Smoker     Years: 16.00     Quit date:      Years since quittin.4     Smokeless tobacco: Never Used   Substance Use Topics     Alcohol use: Yes     Frequency: 2-3 times a week     Drinks per session: 1 or 2     Drug use: Never     Social History     Social History Narrative    Moved to [place] in [place]. [place]. One cat. Two kids, Tarik and Sarah, they live in [place]. No grandchildren.        Current Outpatient Medications   Medication     rosuvastatin (CRESTOR) 10 MG tablet     No current facility-administered medications for this visit.      I have reviewed the patient's past medical, surgical, family, and social history.     OBJECTIVE:   /85    "Pulse 99   Temp 97.1  F (36.2  C)   Resp 14   Ht 1.816 m (5' 11.5\")   Wt 91.1 kg (200 lb 12.8 oz)   SpO2 99%   BMI 27.62 kg/m      Constitutional: well-appearing, appears stated age  Eyes: conjunctivae without erythema, sclera anicteric.   Cardiac: regular rate and rhythm, normal S1/S2, no murmur/rubs/gallops  Respiratory: lungs clear to auscultation bilaterally, normal work of breathing, no wheezes/crackles  Skin: no rashes, lesions, or wounds  Psych: affect is full and appropriate, speech is fluent and non-pressured    Recent Results (from the past 24 hour(s))   Urinalysis(LabDAQ)    Collection Time: 06/11/21  2:39 PM   Result Value Ref Range    Leukocyte Esterase UR 2+ (A) Negative    Nitrite Urine Positive (A) Negative mg/dL    Protein UR Negative Negative mg/dL    Glucose Urine Negative Negative    Ketones Urine Negative Negative mg/dL    Urobilinogen mg/dL 0.2 E.U./dL 0.2 E.U./dL E.U./dL    Bilirubin UR Negative Negative mg/dL    Blood UR 2+ (A) Negative mg/dL    pH Urine 6.5 4.5 - 8.0    Specific Gravity Urine 1.015 1.005 - 1.030         ASSESSMENT AND PLAN:     Matt was seen today for urinary problem.    Diagnoses and all orders for this visit:    Bladder irritability  -     Urinalysis(LabDAQ)    Urinary tract infection associated with catheterization of urinary tract, unspecified indwelling urinary catheter type, initial encounter (H)  -     ciprofloxacin (CIPRO) 750 MG tablet; Take 1 tablet (750 mg) by mouth 2 times daily  -     Urine Culture Aerobic Bacterial    U/A consistent with likely bacterial infection following recent catheterization. Given recent history will treat for complex UTI with cipro as noted above but will also get cultures and sensitivities as ordered above.       Wali Newberry MD  [facility]  06/11/2021, 2:48 PM    "

## 2021-06-15 ENCOUNTER — MYC MEDICAL ADVICE (OUTPATIENT)
Dept: FAMILY MEDICINE | Facility: CLINIC | Age: 51
End: 2021-06-15

## 2021-06-15 DIAGNOSIS — N39.0 URINARY TRACT INFECTION ASSOCIATED WITH CATHETERIZATION OF URINARY TRACT, UNSPECIFIED INDWELLING URINARY CATHETER TYPE, INITIAL ENCOUNTER (H): ICD-10-CM

## 2021-06-15 DIAGNOSIS — T83.511A URINARY TRACT INFECTION ASSOCIATED WITH CATHETERIZATION OF URINARY TRACT, UNSPECIFIED INDWELLING URINARY CATHETER TYPE, INITIAL ENCOUNTER (H): ICD-10-CM

## 2021-06-15 LAB
BACTERIA SPEC CULT: ABNORMAL
BACTERIA SPEC CULT: ABNORMAL
Lab: ABNORMAL
SPECIMEN SOURCE: ABNORMAL

## 2021-06-17 RX ORDER — CIPROFLOXACIN 750 MG/1
750 TABLET, FILM COATED ORAL 2 TIMES DAILY
Qty: 10 TABLET | Refills: 0 | Status: SHIPPED | OUTPATIENT
Start: 2021-06-17 | End: 2022-02-22

## 2021-06-17 NOTE — TELEPHONE ENCOUNTER
Agreed to extension of ABX treatment for complex, catheter associated UTI as noted below.    Diagnoses and all orders for this visit:    Urinary tract infection associated with catheterization of urinary tract, unspecified indwelling urinary catheter type, initial encounter (H)  -     ciprofloxacin (CIPRO) 750 MG tablet; Take 1 tablet (750 mg) by mouth 2 times daily      Wali Newberry MD  8:14 AM, June 17, 2021

## 2021-10-11 ENCOUNTER — HEALTH MAINTENANCE LETTER (OUTPATIENT)
Age: 51
End: 2021-10-11

## 2021-12-05 ENCOUNTER — HEALTH MAINTENANCE LETTER (OUTPATIENT)
Age: 51
End: 2021-12-05

## 2022-01-16 DIAGNOSIS — E78.5 HYPERLIPIDEMIA LDL GOAL <100: ICD-10-CM

## 2022-01-19 ENCOUNTER — MYC MEDICAL ADVICE (OUTPATIENT)
Dept: CARDIOLOGY | Facility: CLINIC | Age: 52
End: 2022-01-19
Payer: COMMERCIAL

## 2022-01-19 DIAGNOSIS — E78.5 HYPERLIPIDEMIA LDL GOAL <100: Primary | ICD-10-CM

## 2022-01-19 RX ORDER — ROSUVASTATIN CALCIUM 10 MG/1
10 TABLET, COATED ORAL DAILY
Qty: 60 TABLET | Refills: 0 | Status: SHIPPED | OUTPATIENT
Start: 2022-01-19 | End: 2022-04-10

## 2022-01-19 NOTE — TELEPHONE ENCOUNTER
rosuvastatin (CRESTOR) 10 MG tablet   Last Written Prescription Date:  2/5/2021  Last Fill Quantity: 90,   # refills: 3  Last Office Visit : 2/5/2021  Future Office visit:  None    Routing refill request to provider for review/approval because:  Second Request  Abnormal/over due LDL  Refer to clinic for review     Recent Labs   Lab Test 11/04/20  0828   .0*       Janine Mejia RN  Central Triage Red Flags/Med Refills

## 2022-01-28 ENCOUNTER — LAB (OUTPATIENT)
Dept: LAB | Facility: CLINIC | Age: 52
End: 2022-01-28
Payer: COMMERCIAL

## 2022-01-28 DIAGNOSIS — E78.5 HYPERLIPIDEMIA LDL GOAL <100: ICD-10-CM

## 2022-01-28 LAB
ALBUMIN SERPL-MCNC: 4.4 G/DL (ref 3.4–5)
ALP SERPL-CCNC: 96 U/L (ref 40–150)
ALT SERPL W P-5'-P-CCNC: 72 U/L (ref 0–70)
ANION GAP SERPL CALCULATED.3IONS-SCNC: 4 MMOL/L (ref 3–14)
AST SERPL W P-5'-P-CCNC: 43 U/L (ref 0–45)
BILIRUB SERPL-MCNC: 0.8 MG/DL (ref 0.2–1.3)
BUN SERPL-MCNC: 14 MG/DL (ref 7–30)
CALCIUM SERPL-MCNC: 9.7 MG/DL (ref 8.5–10.1)
CHLORIDE BLD-SCNC: 109 MMOL/L (ref 94–109)
CO2 SERPL-SCNC: 27 MMOL/L (ref 20–32)
CREAT SERPL-MCNC: 1.05 MG/DL (ref 0.66–1.25)
GFR SERPL CREATININE-BSD FRML MDRD: 85 ML/MIN/1.73M2
GLUCOSE BLD-MCNC: 106 MG/DL (ref 70–99)
POTASSIUM BLD-SCNC: 4.8 MMOL/L (ref 3.4–5.3)
PROT SERPL-MCNC: 7.6 G/DL (ref 6.8–8.8)
SODIUM SERPL-SCNC: 140 MMOL/L (ref 133–144)

## 2022-01-28 PROCEDURE — 80053 COMPREHEN METABOLIC PANEL: CPT

## 2022-02-15 ENCOUNTER — TELEPHONE (OUTPATIENT)
Dept: CARDIOLOGY | Facility: CLINIC | Age: 52
End: 2022-02-15
Payer: COMMERCIAL

## 2022-02-15 NOTE — TELEPHONE ENCOUNTER
Called and talked with pt's wife. Helped instruct pt's wife on how to get pt set up with a PCP. Pt's wife was going to call to make pt an appointment.

## 2022-02-15 NOTE — TELEPHONE ENCOUNTER
[facility] Call Center    Phone Message    May a detailed message be left on voicemail: yes     Reason for Call: Pt needs help to estab. with PCP, pt wants 90 day supply of Crestor vs 60 days-how does he do that? Also, pt was sent msg from heart clinic to refill with PCP, but he doesn't have one yet-please assist. Not: Pt is good for one year regarding cardiology and he doesn't want to have a gap in switching from cards to his PCP re: refills.    Action Taken: routed to [facility] rn pool    Travel Screening: Not Applicable

## 2022-02-22 ENCOUNTER — OFFICE VISIT (OUTPATIENT)
Dept: FAMILY MEDICINE | Facility: CLINIC | Age: 52
End: 2022-02-22
Payer: COMMERCIAL

## 2022-02-22 VITALS
TEMPERATURE: 97.4 F | HEIGHT: 71 IN | OXYGEN SATURATION: 95 % | BODY MASS INDEX: 29.61 KG/M2 | HEART RATE: 74 BPM | SYSTOLIC BLOOD PRESSURE: 133 MMHG | WEIGHT: 211.5 LBS | DIASTOLIC BLOOD PRESSURE: 86 MMHG | RESPIRATION RATE: 12 BRPM

## 2022-02-22 DIAGNOSIS — E78.5 HYPERLIPIDEMIA, UNSPECIFIED HYPERLIPIDEMIA TYPE: Primary | ICD-10-CM

## 2022-02-22 PROBLEM — C67.9 MALIGNANT NEOPLASM OF BLADDER (H): Status: ACTIVE | Noted: 2021-09-28

## 2022-02-22 LAB
CHOLEST SERPL-MCNC: 210 MG/DL
FASTING STATUS PATIENT QL REPORTED: YES
HDLC SERPL-MCNC: 39 MG/DL
LDLC SERPL CALC-MCNC: ABNORMAL MG/DL
NONHDLC SERPL-MCNC: 171 MG/DL
TRIGL SERPL-MCNC: 606 MG/DL

## 2022-02-22 PROCEDURE — 80061 LIPID PANEL: CPT | Performed by: FAMILY MEDICINE

## 2022-02-22 RX ORDER — PILOCARPINE HYDROCHLORIDE 12.5 MG/ML
SOLUTION/ DROPS OPHTHALMIC
COMMUNITY
Start: 2022-01-06 | End: 2023-08-10

## 2022-02-22 NOTE — PROGRESS NOTES
"  Assessment & Plan   Problem List Items Addressed This Visit        Endocrine    Hyperlipidemia - Primary    Relevant Orders    Lipid Profile         Will recheck lipids today. Patient has 60 day supply of Crestor provided by cardiology. Will assume Rx once patient is due for a refill. Patient unclear at this point if [facility] would assume PCP role given annual executive physicals. For now, I will continue in this role.     25 minutes spent on the date of the encounter doing chart review, history and exam, documentation and further activities as noted above.    Wali Newberry MD  [facility]    Subjective   Pablo is a 51 year old who presents for the following health issues    HPI   Hyperlipidemia  - sees cardiology with [facility] who previously started him on a statin  - is now seeing [facility] every year for an executive physical since his recent bladder cancer  - cardiology requests that Pablo contact his PCP to manage statin medication and for ongoing monitoring of cholesterol  - current dosing Crestor 10mg daily  - denies any concerning side effects  - recent CMP 1/28/22 showed mildly elevated ALT, otherwise no concerns  - last Lipid panel 4/23/21 [facility] (mildly elevated triglycerides)  - exercising regularly, monitoring diet      Review of Systems   Constitutional, HEENT, cardiovascular, pulmonary, gi and gu systems are negative, except as otherwise noted.      Objective    /86 (BP Location: Right arm, Patient Position: Sitting, Cuff Size: Adult Large)   Pulse 74   Temp 97.4  F (36.3  C) (Skin)   Resp 12   Ht 1.816 m (5' 11.5\")   Wt 95.9 kg (211 lb 8 oz)   SpO2 95%   BMI 29.09 kg/m    Body mass index is 29.09 kg/m .  Physical Exam   GENERAL: healthy, alert and no distress  NECK: no adenopathy, no asymmetry, masses, or scars and thyroid normal to palpation  RESP: lungs clear to auscultation - no rales, rhonchi or wheezes  CV: regular rate and rhythm, normal S1 S2, no S3 or S4, no murmur, click or rub, no " peripheral edema and peripheral pulses strong  ABDOMEN: soft, nontender, no hepatosplenomegaly, no masses and bowel sounds normal  MS: no gross musculoskeletal defects noted, no edema

## 2022-02-22 NOTE — NURSING NOTE
"51 year old  Chief Complaint   Patient presents with     Hyperlipidemia     refill medication       Blood pressure 133/86, pulse 74, temperature 97.4  F (36.3  C), temperature source Skin, resp. rate 12, height 1.816 m (5' 11.5\"), weight 95.9 kg (211 lb 8 oz), SpO2 95 %. Body mass index is 29.09 kg/m .  Patient Active Problem List   Diagnosis     Palpitations     Elevated liver enzymes     Hematuria     Hyperlipidemia     Lesion of urinary bladder     Multiple pulmonary nodules     Osteoarthritis of hip     Shoulder pain     Malignant neoplasm of bladder (H)       Wt Readings from Last 2 Encounters:   22 95.9 kg (211 lb 8 oz)   21 91.1 kg (200 lb 12.8 oz)     BP Readings from Last 3 Encounters:   22 133/86   21 125/85   21 (!) 150/101         Current Outpatient Medications   Medication     co-enzyme Q-10 100 MG CAPS capsule     Multiple Vitamins-Minerals (ONE DAILY CALCIUM/IRON) TABS     rosuvastatin (CRESTOR) 10 MG tablet     aspirin (ASA) 81 MG EC tablet     ciprofloxacin (CIPRO) 750 MG tablet     metoprolol succinate ER (TOPROL-XL) 25 MG 24 hr tablet     oxybutynin ER (DITROPAN-XL) 10 MG 24 hr tablet     tamsulosin (FLOMAX) 0.4 MG capsule     No current facility-administered medications for this visit.       Social History     Tobacco Use     Smoking status: Former Smoker     Years: 16.00     Quit date:      Years since quittin.1     Smokeless tobacco: Never Used   Vaping Use     Vaping Use: Never used   Substance Use Topics     Alcohol use: Yes     Drug use: Never       Health Maintenance Due   Topic Date Due     ADVANCE CARE PLANNING  Never done     HIV SCREENING  Never done     HEPATITIS C SCREENING  Never done     INFLUENZA VACCINE (1) 2021     PREVENTIVE CARE VISIT  2021       No results found for: PAP      2022 7:59 AM    "

## 2022-04-10 ENCOUNTER — MYC REFILL (OUTPATIENT)
Dept: FAMILY MEDICINE | Facility: CLINIC | Age: 52
End: 2022-04-10
Payer: COMMERCIAL

## 2022-04-10 DIAGNOSIS — E78.5 HYPERLIPIDEMIA LDL GOAL <100: Primary | ICD-10-CM

## 2022-04-12 ENCOUNTER — TELEPHONE (OUTPATIENT)
Dept: FAMILY MEDICINE | Facility: CLINIC | Age: 52
End: 2022-04-12
Payer: COMMERCIAL

## 2022-04-12 RX ORDER — ROSUVASTATIN CALCIUM 10 MG/1
10 TABLET, COATED ORAL DAILY
Qty: 30 TABLET | Refills: 0 | Status: SHIPPED | OUTPATIENT
Start: 2022-04-12 | End: 2022-05-05

## 2022-04-12 NOTE — TELEPHONE ENCOUNTER
Rosuvastatin (Crestor) 10 mg    Last Office Visit: 2/22/22  Future [facility] Appointments: None  Medication last refilled: 1/19/22 #60 with 0 refill(s)    Required labs per protocol:    LAB REF RANGE 4/23/21 2/22/22   LDL < 100 mg/dL 90 No result     Medication is being filled for 1 time refill only due to:  Patient needs labs due for repeat labs in May.  Will refill x 1 month.      Thais Rivera, ROSALINEN, RN, CCM

## 2022-04-12 NOTE — TELEPHONE ENCOUNTER
Who is calling? Patient's Spouse  Medication name: rosuvastatin (CRESTOR) 10 MG tablet  Is this a refill request? Yes  Have they contacted the pharmacy?  Yes  Pharmacy:   [id number] [place], [place] - [address]  [address] [place] [id number]  Phone: [phone] Fax: [phone]    Question/Concern: Patient meet with Dr. Newberry a couple months back about transferring medication management. When patient called for a refill request, the pharmacy send the request to the cardiologist instead of Dr. Newberry.   Would patient like a call back? No

## 2022-05-05 DIAGNOSIS — E78.5 HYPERLIPIDEMIA LDL GOAL <100: ICD-10-CM

## 2022-05-05 RX ORDER — ROSUVASTATIN CALCIUM 10 MG/1
10 TABLET, COATED ORAL DAILY
Qty: 30 TABLET | Refills: 0 | Status: SHIPPED | OUTPATIENT
Start: 2022-05-05 | End: 2022-06-01

## 2022-05-05 NOTE — TELEPHONE ENCOUNTER
Last visit 2/22/22, no future visit  Medication is being filled for 1 time refill only due to:  Patient needs labs lipids - order in chart..   Citizenginehart message to pt re: this.  Melanie Lam RN  [facility]

## 2022-05-10 ENCOUNTER — LAB (OUTPATIENT)
Dept: LAB | Facility: CLINIC | Age: 52
End: 2022-05-10
Payer: COMMERCIAL

## 2022-05-10 DIAGNOSIS — E78.5 HYPERLIPIDEMIA LDL GOAL <100: ICD-10-CM

## 2022-05-10 LAB
CHOLEST SERPL-MCNC: 175 MG/DL
FASTING STATUS PATIENT QL REPORTED: YES
HDLC SERPL-MCNC: 43 MG/DL
LDLC SERPL CALC-MCNC: 97 MG/DL
NONHDLC SERPL-MCNC: 132 MG/DL
TRIGL SERPL-MCNC: 173 MG/DL

## 2022-05-10 PROCEDURE — 80061 LIPID PANEL: CPT

## 2022-06-01 DIAGNOSIS — E78.5 HYPERLIPIDEMIA LDL GOAL <100: ICD-10-CM

## 2022-06-01 RX ORDER — ROSUVASTATIN CALCIUM 10 MG/1
10 TABLET, COATED ORAL DAILY
Qty: 90 TABLET | Refills: 3 | Status: SHIPPED | OUTPATIENT
Start: 2022-06-01 | End: 2023-05-02

## 2022-06-01 NOTE — TELEPHONE ENCOUNTER
Last time prescribed: 5/5/22 , 30 tabs/caps x 0 refills  Last office visit: 2/22/22  Next appointment: No Future Appointment Scheduled  Prescription approved per [facility] Refill Protocol.  Melanie Lam RN  [facility]

## 2022-09-24 ENCOUNTER — HEALTH MAINTENANCE LETTER (OUTPATIENT)
Age: 52
End: 2022-09-24

## 2023-01-29 ENCOUNTER — HEALTH MAINTENANCE LETTER (OUTPATIENT)
Age: 53
End: 2023-01-29

## 2023-05-01 DIAGNOSIS — E78.5 HYPERLIPIDEMIA LDL GOAL <100: ICD-10-CM

## 2023-05-02 RX ORDER — ROSUVASTATIN CALCIUM 10 MG/1
10 TABLET, COATED ORAL DAILY
Qty: 30 TABLET | Refills: 0 | Status: SHIPPED | OUTPATIENT
Start: 2023-05-02 | End: 2023-07-06

## 2023-05-02 NOTE — TELEPHONE ENCOUNTER
Medication requested: rosuvastatin (CRESTOR) 10 MG tablet  Last office visit: 2/2/22  [facility] appointments: none  Medication last refilled: 6/1/22; 90 + 3 refills  Last qualifying labs:   Component      Latest Ref Rng 5/10/2022  8:27 AM   Cholesterol      <200 mg/dL 175    Triglycerides      <150 mg/dL 173 (H)    HDL Cholesterol      >=40 mg/dL 43    LDL Cholesterol Calculated      <=100 mg/dL 97    Non HDL Cholesterol      <130 mg/dL 132 (H)    Patient Fasting? Yes      Medication is being filled for 1 time refill only due to:  Patient needs labs lipid panel. Patient needs to be seen because it has been more than one year since last visit.    Message sent to pt to schedule appointment.    Carlin ALEXANDER, RN  05/02/23 6:01 PM

## 2023-07-05 DIAGNOSIS — E78.5 HYPERLIPIDEMIA LDL GOAL <100: ICD-10-CM

## 2023-07-06 RX ORDER — ROSUVASTATIN CALCIUM 10 MG/1
10 TABLET, COATED ORAL DAILY
Qty: 30 TABLET | Refills: 0 | Status: SHIPPED | OUTPATIENT
Start: 2023-07-06 | End: 2023-08-01

## 2023-07-06 NOTE — TELEPHONE ENCOUNTER
Medication requested: rosuvastatin (CRESTOR) 10 MG tablet  Last office visit: 2/22/22  [facility] appointments: none  Medication last refilled: 5/2/23; 30 + 0 refills  Last qualifying labs:   Component      Latest Ref Rng 5/10/2022  8:27 AM   Cholesterol      <200 mg/dL 175    Triglycerides      <150 mg/dL 173 (H)    HDL Cholesterol      >=40 mg/dL 43    LDL Cholesterol Calculated      <=100 mg/dL 97    Non HDL Cholesterol      <130 mg/dL 132 (H)    Patient Fasting? Yes      Routing refill request to provider for review/approval because:  Francoise given x1 and patient did not follow up, please advise  Patient needs to be seen because it has been more than 1 year since last office visit.    Carlin ALEXANDER, RN  07/06/23 8:43 AM

## 2023-07-06 NOTE — TELEPHONE ENCOUNTER
One time refill provided for 30 days. Patient needs to be seen in clinic for further refills.     Matt was seen today for refill request.    Diagnoses and all orders for this visit:    Hyperlipidemia LDL goal <100  -     rosuvastatin (CRESTOR) 10 MG tablet; Take 1 tablet (10 mg) by mouth daily Schedule appointment for refills      Wali Newberry MD  9:52 AM, July 6, 2023

## 2023-07-12 ENCOUNTER — MYC MEDICAL ADVICE (OUTPATIENT)
Dept: FAMILY MEDICINE | Facility: CLINIC | Age: 53
End: 2023-07-12

## 2023-08-01 DIAGNOSIS — E78.5 HYPERLIPIDEMIA LDL GOAL <100: ICD-10-CM

## 2023-08-02 RX ORDER — ROSUVASTATIN CALCIUM 10 MG/1
10 TABLET, COATED ORAL DAILY
Qty: 30 TABLET | Refills: 0 | Status: SHIPPED | OUTPATIENT
Start: 2023-08-02 | End: 2023-08-28

## 2023-08-02 NOTE — TELEPHONE ENCOUNTER
Medication requested: rosuvastatin (CRESTOR) 10 MG tablet   Last office visit: 2/22/22  [facility] appointments: none  Medication last refilled: 7/6/23; 30 + 0 refills  Last qualifying labs:   Component      Latest Ref Rng 5/10/2022  8:27 AM   Cholesterol      <200 mg/dL 175    Triglycerides      <150 mg/dL 173 (H)    HDL Cholesterol      >=40 mg/dL 43    LDL Cholesterol Calculated      <=100 mg/dL 97    Non HDL Cholesterol      <130 mg/dL 132 (H)    Patient Fasting? Yes      Medication is being filled for 1 time refill only due to:  Patient needs to be seen because it has been more than one year since last visit.    Final refill until pt seen in clinic. Message sent to pt to schedule appt with PCP.    Carlin ALEXANDER, RN  08/02/23 9:00 AM

## 2023-08-09 NOTE — PROGRESS NOTES
"  Assessment & Plan   Problem List Items Addressed This Visit    None  Visit Diagnoses       Hyperlipidemia LDL goal <100    -  Primary    Relevant Orders    Lipid panel    Rash        Relevant Medications    clotrimazole-betamethasone (LOTRISONE) 1-0.05 % external cream           Rash likely initially a fungal infection with persistent dermatitis. Will treat as  noted above. Encouraged Pablo to monitor and contact me with any further concerns.    Rechecking cholesterol today. Will notify Pablo of results    32 minutes spent on the date of the encounter doing chart review, history and exam, documentation and further activities as noted.      MD ALEJO Gallegos [facility]    Subjective   Pablo is a 52 year old, presenting for the following health issues:  Recheck Medication      HPI   Rash  - at his groin for the past 2 months or so  - has been trying OTC hydrocortisone which he thinks helped a little with the itch  - previously tried \"some kind of fungal cream\" but can't really say if it helped much  - no dysuria, hematuria, no penile lesions, no hematospermia or pain with ejaculation    Hyperlipidemia  - current meds: rosuvastatin 10mg daily  - reports he has been consistent with taking the medication  - denies any concerning side effects  Lab Results   Component Value Date    CHOL 175 05/10/2022    CHOL 210 02/22/2022    CHOL 253.0 11/04/2020    CHOL 240 05/23/2018     Lab Results   Component Value Date    HDL 43 05/10/2022    HDL 39 02/22/2022    HDL 44.0 11/04/2020    HDL 47 05/23/2018     Lab Results   Component Value Date    LDL 97 05/10/2022    LDL  02/22/2022      Comment:      Cannot estimate LDL when triglyceride exceeds 400 mg/dL    .0 11/04/2020     05/23/2018     Lab Results   Component Value Date    TRIG 173 05/10/2022    TRIG 606 02/22/2022    TRIG 381.0 11/04/2020    TRIG 191 05/23/2018     Lab Results   Component Value Date    CHOLHDLRATIO 5.7 11/04/2020      Review of " "Systems   Constitutional, HEENT, cardiovascular, pulmonary, gi and gu systems are negative, except as otherwise noted.      Objective    /84 (BP Location: Left arm, Patient Position: Sitting, Cuff Size: Adult Large)   Pulse 80   Temp 97.1  F (36.2  C) (Skin)   Resp 15   Ht 1.816 m (5' 11.5\")   Wt 94.8 kg (209 lb)   SpO2 96%   BMI 28.74 kg/m    Body mass index is 28.74 kg/m .  Physical Exam   GENERAL: healthy, alert and no distress  NECK: no adenopathy, no asymmetry, masses, or scars and thyroid normal to palpation  RESP: lungs clear to auscultation - no rales, rhonchi or wheezes  CV: regular rate and rhythm, normal S1 S2, no S3 or S4, no murmur, click or rub, no peripheral edema and peripheral pulses strong  MS: no gross musculoskeletal defects noted, no edema                "

## 2023-08-10 ENCOUNTER — OFFICE VISIT (OUTPATIENT)
Dept: FAMILY MEDICINE | Facility: CLINIC | Age: 53
End: 2023-08-10
Payer: COMMERCIAL

## 2023-08-10 VITALS
TEMPERATURE: 97.1 F | SYSTOLIC BLOOD PRESSURE: 129 MMHG | HEART RATE: 80 BPM | HEIGHT: 72 IN | DIASTOLIC BLOOD PRESSURE: 84 MMHG | WEIGHT: 209 LBS | OXYGEN SATURATION: 96 % | RESPIRATION RATE: 15 BRPM | BODY MASS INDEX: 28.31 KG/M2

## 2023-08-10 DIAGNOSIS — E78.5 HYPERLIPIDEMIA LDL GOAL <100: Primary | ICD-10-CM

## 2023-08-10 DIAGNOSIS — R21 RASH: ICD-10-CM

## 2023-08-10 LAB
CHOLEST SERPL-MCNC: 175 MG/DL
HDLC SERPL-MCNC: 51 MG/DL
LDLC SERPL CALC-MCNC: 107 MG/DL
NONHDLC SERPL-MCNC: 124 MG/DL
TRIGL SERPL-MCNC: 85 MG/DL

## 2023-08-10 PROCEDURE — 80061 LIPID PANEL: CPT | Performed by: FAMILY MEDICINE

## 2023-08-10 RX ORDER — CLOTRIMAZOLE AND BETAMETHASONE DIPROPIONATE 10; .64 MG/G; MG/G
CREAM TOPICAL 2 TIMES DAILY
Qty: 45 G | Refills: 0 | Status: SHIPPED | OUTPATIENT
Start: 2023-08-10

## 2023-08-10 RX ORDER — AMLODIPINE BESYLATE 5 MG/1
7.5 TABLET ORAL DAILY
COMMUNITY
Start: 2023-02-15

## 2023-08-10 NOTE — NURSING NOTE
"52 year old  Chief Complaint   Patient presents with    Recheck Medication       Blood pressure 129/84, pulse 80, temperature 97.1  F (36.2  C), temperature source Skin, resp. rate 15, height 1.816 m (5' 11.5\"), weight 94.8 kg (209 lb), SpO2 96 %. Body mass index is 28.74 kg/m .  Patient Active Problem List   Diagnosis    Palpitations    Elevated liver enzymes    Hematuria    Hyperlipidemia    Lesion of urinary bladder    Multiple pulmonary nodules    Osteoarthritis of hip    Shoulder pain    Malignant neoplasm of bladder (H)       Wt Readings from Last 2 Encounters:   08/10/23 94.8 kg (209 lb)   22 95.9 kg (211 lb 8 oz)     BP Readings from Last 3 Encounters:   08/10/23 129/84   22 133/86   21 125/85         Current Outpatient Medications   Medication    amLODIPine (NORVASC) 5 MG tablet    co-enzyme Q-10 100 MG CAPS capsule    Multiple Vitamins-Minerals (ONE DAILY CALCIUM/IRON) TABS    rosuvastatin (CRESTOR) 10 MG tablet    VUITY 1.25 % SOLN     No current facility-administered medications for this visit.       Social History     Tobacco Use    Smoking status: Former     Years: 16.00     Types: Cigarettes     Quit date:      Years since quittin.6    Smokeless tobacco: Never   Vaping Use    Vaping Use: Never used   Substance Use Topics    Alcohol use: Yes    Drug use: Never       Health Maintenance Due   Topic Date Due    ADVANCE CARE PLANNING  Never done    HEPATITIS B IMMUNIZATION (1 of 3 - 3-dose series) Never done    HIV SCREENING  Never done    HEPATITIS C SCREENING  Never done    YEARLY PREVENTIVE VISIT  2021    LUNG CANCER SCREENING  2022       No results found for: PAP      August 10, 2023 8:27 AM    "

## 2023-08-28 ENCOUNTER — MYC REFILL (OUTPATIENT)
Dept: FAMILY MEDICINE | Facility: CLINIC | Age: 53
End: 2023-08-28

## 2023-08-28 DIAGNOSIS — E78.5 HYPERLIPIDEMIA LDL GOAL <100: ICD-10-CM

## 2023-08-29 RX ORDER — ROSUVASTATIN CALCIUM 10 MG/1
10 TABLET, COATED ORAL DAILY
Qty: 90 TABLET | Refills: 3 | Status: SHIPPED | OUTPATIENT
Start: 2023-08-29 | End: 2024-07-03

## 2023-08-29 NOTE — TELEPHONE ENCOUNTER
Medication requested: rosuvastatin (CRESTOR) 10 MG tablet   Last office visit: 8/10/23  [facility] appointments: none  Medication last refilled: 8/2/23; 30 + 0 refills  Last qualifying labs:   Component      Latest Ref Rng 8/10/2023  8:48 AM   Cholesterol      <200 mg/dL 175    Triglycerides      <150 mg/dL 85    HDL Cholesterol      >=40 mg/dL 51    LDL Cholesterol Calculated      <=100 mg/dL 107 (H)    Non HDL Cholesterol      <130 mg/dL 124      Prescription approved per [facility] Refill Protocol.    Carlin ALEXANDER, RN  08/29/23 1:36 PM

## 2024-03-02 ENCOUNTER — HEALTH MAINTENANCE LETTER (OUTPATIENT)
Age: 54
End: 2024-03-02

## 2024-04-08 ENCOUNTER — OFFICE VISIT (OUTPATIENT)
Dept: FAMILY MEDICINE | Facility: CLINIC | Age: 54
End: 2024-04-08
Payer: COMMERCIAL

## 2024-04-08 VITALS
OXYGEN SATURATION: 100 % | WEIGHT: 200.5 LBS | RESPIRATION RATE: 17 BRPM | DIASTOLIC BLOOD PRESSURE: 75 MMHG | TEMPERATURE: 96.7 F | BODY MASS INDEX: 27.57 KG/M2 | HEART RATE: 84 BPM | SYSTOLIC BLOOD PRESSURE: 115 MMHG

## 2024-04-08 DIAGNOSIS — R19.7 DIARRHEA, UNSPECIFIED TYPE: Primary | ICD-10-CM

## 2024-04-08 NOTE — NURSING NOTE
53 year old  Chief Complaint   Patient presents with    Dizziness    Gastrointestinal Problem     4/5 returned from a trip, felt tired. Was feeling cold, did not take temp. Was having diarrhea and fatigue. Was eating alberto bars, in bed over the weekend. Notes he has lost about #4. Was staying hydrated. Dizziness sx with sitting to standing, when he gets out of bed will be dizzy for a second -- thinks he has some nodules/cyrstals in his ears that may be causing this. Checks BP at home 120-130/70-80 normally.       Blood pressure 115/75, pulse 84, temperature (!) 96.7  F (35.9  C), temperature source Temporal, resp. rate 17, weight 90.9 kg (200 lb 8 oz), SpO2 100%. Body mass index is 27.57 kg/m .  Patient Active Problem List   Diagnosis    Palpitations    Elevated liver enzymes    Hematuria    Hyperlipidemia    Lesion of urinary bladder    Multiple pulmonary nodules    Osteoarthritis of hip    Shoulder pain    Malignant neoplasm of bladder (H)       Wt Readings from Last 2 Encounters:   24 90.9 kg (200 lb 8 oz)   08/10/23 94.8 kg (209 lb)     BP Readings from Last 3 Encounters:   24 115/75   08/10/23 129/84   22 133/86         Current Outpatient Medications   Medication Sig Dispense Refill    amLODIPine (NORVASC) 5 MG tablet Take 7.5 mg by mouth daily      clotrimazole-betamethasone (LOTRISONE) 1-0.05 % external cream Apply topically 2 times daily 45 g 0    co-enzyme Q-10 100 MG CAPS capsule Take 100 mg by mouth      Multiple Vitamins-Minerals (ONE DAILY CALCIUM/IRON) TABS Take 1 tablet by mouth      rosuvastatin (CRESTOR) 10 MG tablet Take 1 tablet (10 mg) by mouth daily (Patient taking differently: Take 20 mg by mouth daily) 90 tablet 3     No current facility-administered medications for this visit.       Social History     Tobacco Use    Smoking status: Former     Years: 16     Types: Cigarettes     Quit date:      Years since quittin.2    Smokeless tobacco: Never   Vaping Use    Vaping  "Use: Never used   Substance Use Topics    Alcohol use: Yes    Drug use: Never       Health Maintenance Due   Topic Date Due    ADVANCE CARE PLANNING  Never done    HIV SCREENING  Never done    HEPATITIS C SCREENING  Never done    HEPATITIS B IMMUNIZATION (1 of 3 - 19+ 3-dose series) Never done    YEARLY PREVENTIVE VISIT  11/04/2021    INFLUENZA VACCINE (1) 09/01/2023    COVID-19 Vaccine (4 - 2023-24 season) 09/01/2023       No results found for: \"PAP\"      April 8, 2024 9:06 AM    "

## 2024-04-08 NOTE — PATIENT INSTRUCTIONS
ASSESSMENT and PLAN:     Pablo is a 52 yo who is recovering from acute diarrhea. Unclear etiology but likely viral given what has been going on in community. He's 90% better and exam is normal. May be a little dehydrated as evidenced by slightly higher than normal pulse.     PLAN:    Suggested hydration with electrolytes and continued observations  Did not see reason to do further testing today or treat with antibiotics  If stools remain watery, then send me a message and we can order stool testing    Pablo felt comfortable with plan    Follow-up as needed    Suhail Ann MD  Family Medicine and Sports Medicine  [facility]

## 2024-04-08 NOTE — PROGRESS NOTES
"    ASSESSMENT and PLAN:     Pablo is a 54 yo who is recovering from acute diarrhea. Unclear etiology but likely viral given what has been going on in community. He's 90% better and exam is normal. May be a little dehydrated as evidenced by slightly higher than normal pulse.     PLAN:   Suggested hydration with electrolytes and continued observations  Did not see reason to do further testing today or treat with antibiotics  If stools remain watery, then send me a message and we can order stool testing    Pablo felt comfortable with plan    Follow-up as needed    Suhail Ann MD  Family Medicine and Sports Medicine  [facility]      Medical assistant intake:  Matt Haas is a 53 year old male who presents to [facility] today for Dizziness and Gastrointestinal Problem (4/5 returned from a trip, felt tired. Was feeling cold, did not take temp. Was having diarrhea and fatigue. Was eating alberto bars, in bed over the weekend. Notes he has lost about #4. Was staying hydrated. Dizziness sx with sitting to standing, when he gets out of bed will be dizzy for a second -- thinks he has some nodules/cyrstals in his ears that may be causing this. Checks BP at home 120-130/70-80 normally.)      SUBJECTIVE:   This is my first time meeting Pablo. He's here due to diarrhea for the past 3 days. States that he's been working very hard and had a short trip to [place] and then developed diarrhea 3 days ago on 4/5. Was extremely tired. Slept excessively, but no fever, no headache, and no nausea. Has now been eating protein bars and drinking lots of water. His appetite is \"OK\", but not at baseline. The diarrhea has slowed down greatly. Still with watery BM this morning. Had small bits of formed stool.   Feels \"90% back to baseline.\"   Hasn't been out of the country or eaten atypical foods.     He's had some benign positional vertigo off and on and feels that that's more noticeable since his diarrhea. No imbalance.     Review " Of Systems:    See subjective.   Has otherwise been in usual state of health, e.g.   Cardiovascular: negative  Respiratory: No shortness of breath, dyspnea on exertion, cough, or hemoptysis  Genitourinary: negative    Problem list per EMR:  Patient Active Problem List   Diagnosis    Palpitations    Elevated liver enzymes    Hematuria    Hyperlipidemia    Lesion of urinary bladder    Multiple pulmonary nodules    Osteoarthritis of hip    Shoulder pain    Malignant neoplasm of bladder (H)       Current Outpatient Medications   Medication Sig Dispense Refill    amLODIPine (NORVASC) 5 MG tablet Take 7.5 mg by mouth daily      clotrimazole-betamethasone (LOTRISONE) 1-0.05 % external cream Apply topically 2 times daily 45 g 0    co-enzyme Q-10 100 MG CAPS capsule Take 100 mg by mouth      Multiple Vitamins-Minerals (ONE DAILY CALCIUM/IRON) TABS Take 1 tablet by mouth      rosuvastatin (CRESTOR) 10 MG tablet Take 1 tablet (10 mg) by mouth daily (Patient taking differently: Take 20 mg by mouth daily) 90 tablet 3       No Known Allergies     Social:    of [employer].    OBJECTIVE    Vitals: /75 (BP Location: Left arm, Patient Position: Sitting, Cuff Size: Adult Large)   Pulse 84   Temp (!) 96.7  F (35.9  C) (Temporal)   Resp 17   Wt 90.9 kg (200 lb 8 oz)   SpO2 100%   BMI 27.57 kg/m    BMI= Body mass index is 27.57 kg/m .   he appears well and in no distress.  His neck is supple and without any lymphadenopathy.    Cardiovascular-regular rate and rhythm without murmur    Lungs-clear to auscultation    Abdomen-no guarding.  No point tenderness but a generalized discomfort in the periumbilical region.  No rebound.  No hepatosplenomegaly.    Extremities are without edema and his gait is completely normal.    SEE TOP OF NOTE FOR ASSESSMENT AND PLAN    --Suhail Ann MD  [facility], Department of Family Medicine and Community Health

## 2024-07-02 ENCOUNTER — MYC MEDICAL ADVICE (OUTPATIENT)
Dept: FAMILY MEDICINE | Facility: CLINIC | Age: 54
End: 2024-07-02

## 2024-07-02 DIAGNOSIS — E78.5 HYPERLIPIDEMIA LDL GOAL <100: ICD-10-CM

## 2024-07-03 RX ORDER — ROSUVASTATIN CALCIUM 20 MG/1
20 TABLET, COATED ORAL DAILY
Qty: 90 TABLET | Refills: 1 | Status: SHIPPED | OUTPATIENT
Start: 2024-07-03

## 2024-07-03 NOTE — TELEPHONE ENCOUNTER
Patient was instructed to increase his rosuvastatin dosage to 20 mg daily by cardiology at [facility] in February. Pt needs refill now, will send new order for 20 mg dosage strength.    Carlin ALEXANDER, RN  07/03/24 8:17 AM

## 2025-01-13 ENCOUNTER — MYC REFILL (OUTPATIENT)
Dept: FAMILY MEDICINE | Facility: CLINIC | Age: 55
End: 2025-01-13

## 2025-01-13 DIAGNOSIS — E78.5 HYPERLIPIDEMIA LDL GOAL <100: ICD-10-CM

## 2025-01-14 RX ORDER — ROSUVASTATIN CALCIUM 20 MG/1
20 TABLET, COATED ORAL DAILY
Qty: 90 TABLET | Refills: 0 | Status: SHIPPED | OUTPATIENT
Start: 2025-01-14

## 2025-01-14 NOTE — TELEPHONE ENCOUNTER
Medication requested: rosuvastatin (CRESTOR) 20 MG tablet   Last office visit: 4/8/24  [facility] appointments: none  Medication last refilled: 7/3/24; 90 + 1 refill  Last qualifying labs:   Component      Latest Ref Rng 8/10/2023  8:48 AM   Cholesterol      <200 mg/dL 175    Triglycerides      <150 mg/dL 85    HDL Cholesterol      >=40 mg/dL 51    LDL Cholesterol Calculated      <=100 mg/dL 107 (H)    Non HDL Cholesterol      <130 mg/dL 124       Routing refill request to provider for review/approval because:  Labs not current:  lipid panel    Carlin ALEXANDER, RN  01/14/25 10:39 AM

## 2025-01-14 NOTE — TELEPHONE ENCOUNTER
Med refilled as noted.     Pablo was seen today for refill request.    Diagnoses and all orders for this visit:    Hyperlipidemia LDL goal <100  -     rosuvastatin (CRESTOR) 20 MG tablet; Take 1 tablet (20 mg) by mouth daily.      Wali Newberry MD  1:04 PM, January 14, 2025

## 2025-03-15 ENCOUNTER — HEALTH MAINTENANCE LETTER (OUTPATIENT)
Age: 55
End: 2025-03-15

## (undated) DEVICE — ENDO TRAP POLYP E-TRAP 00711099

## (undated) DEVICE — GOWN IMPERVIOUS 2XL BLUE

## (undated) DEVICE — SOL WATER IRRIG 1000ML BOTTLE 2F7114

## (undated) DEVICE — SPECIMEN CONTAINER 3OZ W/FORMALIN 59901

## (undated) DEVICE — TUBING SUCTION 12"X1/4" N612

## (undated) DEVICE — ENDO SNARE EXACTO COLD 9MM LOOP 2.4MMX230CM 00711115

## (undated) DEVICE — SUCTION MANIFOLD NEPTUNE 2 SYS 1 PORT 702-025-000

## (undated) DEVICE — KIT ENDO TURNOVER/PROCEDURE CARRY-ON 101822

## (undated) RX ORDER — FENTANYL CITRATE 50 UG/ML
INJECTION, SOLUTION INTRAMUSCULAR; INTRAVENOUS
Status: DISPENSED
Start: 2021-01-07